# Patient Record
Sex: FEMALE | Race: WHITE | Employment: OTHER | RURAL
[De-identification: names, ages, dates, MRNs, and addresses within clinical notes are randomized per-mention and may not be internally consistent; named-entity substitution may affect disease eponyms.]

---

## 2017-01-19 DIAGNOSIS — K59.01 SLOW TRANSIT CONSTIPATION: ICD-10-CM

## 2017-01-20 RX ORDER — POLYETHYLENE GLYCOL 3350 17 G/17G
17 POWDER, FOR SOLUTION ORAL 2 TIMES DAILY
Qty: 510 G | Refills: 11 | Status: SHIPPED | OUTPATIENT
Start: 2017-01-20 | End: 2017-07-12

## 2017-02-21 ENCOUNTER — OFFICE VISIT (OUTPATIENT)
Dept: FAMILY MEDICINE CLINIC | Age: 82
End: 2017-02-21

## 2017-02-21 VITALS
SYSTOLIC BLOOD PRESSURE: 150 MMHG | HEART RATE: 80 BPM | OXYGEN SATURATION: 97 % | BODY MASS INDEX: 22.47 KG/M2 | DIASTOLIC BLOOD PRESSURE: 70 MMHG | RESPIRATION RATE: 16 BRPM | WEIGHT: 135 LBS

## 2017-02-21 DIAGNOSIS — R60.0 BILATERAL LEG EDEMA: Primary | ICD-10-CM

## 2017-02-21 DIAGNOSIS — I35.1 AORTIC VALVE INSUFFICIENCY, UNSPECIFIED ETIOLOGY: ICD-10-CM

## 2017-02-21 NOTE — PROGRESS NOTES
Chief Complaint   Patient presents with    Back Pain     burning sensation, small sore coccyx         HPI:       is a 80 y.o. female. She is a retired  who recently lost her best friend due to an MI. Yfn Ponce has a history of a pleural based B cell lymphoma (chemo), Pacemaker, renal insufficiency (Cr 1.7), CHF-compensated, HTN, and shingles. She lives alone. Leg edema management at home has been good and the edema tends to worsen as the day progresses. Elevates legs. Begrudgingly takes Bumetanide daily. Complains about the size of the potassium tablet. New Issues:  Sacral wound is improving. No Known Allergies    Current Outpatient Prescriptions   Medication Sig    polyethylene glycol (MIRALAX) 17 gram/dose powder Take 17 g by mouth two (2) times a day. Indications: Constipation    magnesium citrate 100 mg tab Take  by mouth.  lactulose (CHRONULAC) 10 gram/15 mL solution Take 20 g by mouth two (2) times a day.  naproxen sodium (ALEVE) 220 mg cap Take  by mouth.  bumetanide (BUMEX) 1 mg tablet Take 2 Tabs by mouth daily. Indications: EDEMA    potassium chloride (K-DUR, KLOR-CON) 10 mEq tablet Take 1 Tab by mouth daily.  prednisoLONE acetate (PRED FORTE) 1 % ophthalmic suspension Administer 1 Drop to left eye four (4) times daily.  triamcinolone acetonide (KENALOG) 0.1 % topical cream Apply  to affected area two (2) times daily as needed for Skin Irritation.  PHENYLEPH/MINERAL OIL/PETROLAT (PREPARATION H RE) Insert  into rectum.  magnesium hydroxide (MILK OF MAGNESIA) 400 mg/5 mL suspension Take 30 mL by mouth daily as needed for Constipation.  senna-docusate (PERICOLACE) 8.6-50 mg per tablet Take 1 Tab by mouth daily as needed for Constipation.  OTHER,NON-FORMULARY, Indications: Moisture cream with colloidal oatmeal    pilocarpine (PILOCAR) 4 % ophthalmic solution Administer 1 Drop to left eye four (4) times daily.     dorzolamide-timolol (COSOPT) 2-0.5 % ophthalmic solution Administer 1 Drop to left eye two (2) times a day.  bimatoprost (LUMIGAN) 0.03 % ophthalmic drops Administer 1 Drop to left eye nightly.  HYDROcodone-acetaminophen (NORCO) 5-325 mg per tablet Take 1 Tab by mouth every eight (8) hours as needed for Pain. Max Daily Amount: 3 Tabs.  hydrOXYzine (ATARAX) 25 mg tablet Take 25 mg by mouth nightly as needed. No current facility-administered medications for this visit. 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    [facility]     Bronchitis     Carotid disease, bilateral ([facility]     Cholelithiasis 2009    Clostridium difficile colitis July 17, 2016     [facility] admission    Constipation     CRI (chronic renal insufficiency)     Dyspepsia and other specified disorders of function of stomach     Frequent urination     Glaucoma     HCVD (hypertensive cardiovascular disease)     Hemorrhoids     History of seasonal allergies     Hypercholesterolemia          ROS:  Denies fever, chills, cough, chest pain, SOB,  nausea, vomiting, or diarrhea. Denies wt loss, wt gain, hemoptysis, hematochezia or melena. Physical Examination:    Visit Vitals    /70 (BP 1 Location: Left arm, BP Patient Position: Sitting)    Pulse 80    Resp 16    Wt 135 lb (61.2 kg)    SpO2 97%    BMI 22.47 kg/m2     General: Alert and Ox3, Fluent speech  HEENT:  NC/AT, EOMI, OP: clear  Neck:  Supple, no adenopathy, JVD, mass or bruit  Chest:  Clear to Ausculation, without wheezes, rales, rubs or ronchi  Cardiac: RRR with a 2/6 diastolic blow  Abdomen:  +BS, soft, nontender without palpable HSM  Extremities:  BLE edema about 5 cm above the malleoli   Neurologic:  Ambulatory without assist, CN 2-12 grossly intact. Moves all extremities.   Skin: no rash  Lymphadenopathy: no cervical or supraclavicular nodes      ASSESSMENT AND PLAN:     1. BLE edema:  Improved. Excellent care at home and compliance has resulted in resolution of her cellulitis and has kept her out of the hospital and nursing home. 2.  AI:  Stable at this time  3. Back pain:  Likely due to spinal stenosis. Needs to purchase a \"grabber\" and to avoid bending over. 4.  RTC in 2 months. 5. Tiny sacral decubitus is just about resolved. No orders of the defined types were placed in this encounter.       Je Palomino MD, [address]

## 2017-02-21 NOTE — MR AVS SNAPSHOT
Visit Information Date & Time Provider Department Dept. Phone Encounter #  
 2/21/2017  2:30 PM Hanna Montoya MD [address] [place] [address] [id number] Your Appointments 4/21/2017  1:00 PM  
ESTABLISHED PATIENT with Alcides Wang MD  
[id number] [facility] [place] - [facility]) Appt Note: Medtronic pacemaker check - poss f/u  
 [address] [phone]  
  
   
 [address] [address] Upcoming Health Maintenance Date Due DTaP/Tdap/Td series (1 - Tdap) 5/29/1941 ZOSTER VACCINE AGE 60> 5/29/1980 GLAUCOMA SCREENING Q2Y 5/29/1985 OSTEOPOROSIS SCREENING (DEXA) 5/29/1985 Pneumococcal 65+ High/Highest Risk (1 of 2 - PCV13) 5/29/1985 MEDICARE YEARLY EXAM 3/17/2017 Allergies as of 2/21/2017  Review Complete On: 2/21/2017 By: Valdemar Mina RN No Known Allergies Current Immunizations  Reviewed on 6/9/2016 Name Date Influenza High Dose Vaccine PF 11/22/2016  4:35 PM  
 Influenza Vaccine 10/7/2015 Not reviewed this visit Vitals BP Pulse Resp Weight(growth percentile) SpO2 BMI  
 150/70 (BP 1 Location: Left arm, BP Patient Position: Sitting) 80 16 135 lb (61.2 kg) 97% 22.47 kg/m2 Smoking Status Never Smoker BMI and BSA Data Body Mass Index Body Surface Area  
 22.47 kg/m 2 1.68 m 2 Preferred Pharmacy Pharmacy Name Phone [facility] [place] [address] [phone] Your Updated Medication List  
  
   
This list is accurate as of: 2/21/17  4:00 PM.  Always use your most recent med list.  
  
  
  
  
 ALEVE 220 mg Cap Generic drug:  naproxen sodium Take  by mouth. bumetanide 1 mg tablet Commonly known as:  Assunta Brink Take 2 Tabs by mouth daily. Indications: EDEMA COSOPT 22.3-6.8 mg/mL ophthalmic solution Generic drug:  dorzolamide-timolol Administer 1 Drop to left eye two (2) times a day. HYDROcodone-acetaminophen 5-325 mg per tablet Commonly known as:  Maribel Obregon Take 1 Tab by mouth every eight (8) hours as needed for Pain. Max Daily Amount: 3 Tabs. hydrOXYzine HCl 25 mg tablet Commonly known as:  ATARAX Take 25 mg by mouth nightly as needed. lactulose 10 gram/15 mL solution Commonly known as:  Tonna Jade Take 20 g by mouth two (2) times a day. LUMIGAN 0.03 % ophthalmic drops Generic drug:  bimatoprost  
Administer 1 Drop to left eye nightly.  
  
 magnesium citrate 100 mg Tab Take  by mouth.  
  
 magnesium hydroxide 400 mg/5 mL suspension Commonly known as:  MILK OF MAGNESIA Take 30 mL by mouth daily as needed for Constipation. OTHER(NON-FORMULARY) Indications: Moisture cream with colloidal oatmeal  
  
 pilocarpine 4 % ophthalmic solution Commonly known as:  PILOCAR Administer 1 Drop to left eye four (4) times daily. polyethylene glycol 17 gram/dose powder Commonly known as:  Ronaldo Chance Take 17 g by mouth two (2) times a day. Indications: Constipation  
  
 potassium chloride 10 mEq tablet Commonly known as:  K-DUR, KLOR-CON Take 1 Tab by mouth daily. prednisoLONE acetate 1 % ophthalmic suspension Commonly known as:  PRED FORTE Administer 1 Drop to left eye four (4) times daily. PREPARATION H RE Insert  into rectum. senna-docusate 8.6-50 mg per tablet Commonly known as:  Angelique Irwin Take 1 Tab by mouth daily as needed for Constipation. triamcinolone acetonide 0.1 % topical cream  
Commonly known as:  KENALOG Apply  to affected area two (2) times daily as needed for Skin Irritation. Introducing [facility]! [address] [facility] introduces C-sam patient portal. Now you can access parts of your medical record, email your doctor's office, and request medication refills online. 1. In your internet browser, go to [email or web address] 2. Click on the First Time User? Click Here link in the Sign In box. You will see the New Member Sign Up page. 3. Enter your Gamerius Access Code exactly as it appears below. You will not need to use this code after youve completed the sign-up process. If you do not sign up before the expiration date, you must request a new code. · Gamerius Access Code: [email or web address] Expires: 3/20/2017  4:08 PM 
 
4. Enter the last four digits of your Social Security Number (xxxx) and Date of Birth (mm/dd/yyyy) as indicated and click Submit. You will be taken to the next sign-up page. 5. Create a Zoned Nutritiont ID. This will be your Gamerius login ID and cannot be changed, so think of one that is secure and easy to remember. 6. Create a Gamerius password. You can change your password at any time. 7. Enter your Password Reset Question and Answer. This can be used at a later time if you forget your password. 8. Enter your e-mail address. You will receive e-mail notification when new information is available in [address] 9. Click Sign Up. You can now view and download portions of your medical record. 10. Click the Download Summary menu link to download a portable copy of your medical information. If you have questions, please visit the Frequently Asked Questions section of the Gamerius website. Remember, Gamerius is NOT to be used for urgent needs. For medical emergencies, dial 911. Now available from your iPhone and Android! Please provide this summary of care documentation to your next provider. Your primary care clinician is listed as Vaishali Shepherd. If you have any questions after today's visit, please call [phone].

## 2017-04-21 ENCOUNTER — CLINICAL SUPPORT (OUTPATIENT)
Dept: CARDIOLOGY CLINIC | Age: 82
End: 2017-04-21

## 2017-04-21 ENCOUNTER — OFFICE VISIT (OUTPATIENT)
Dept: CARDIOLOGY CLINIC | Age: 82
End: 2017-04-21

## 2017-04-21 VITALS
DIASTOLIC BLOOD PRESSURE: 70 MMHG | SYSTOLIC BLOOD PRESSURE: 130 MMHG | BODY MASS INDEX: 22.89 KG/M2 | HEART RATE: 84 BPM | OXYGEN SATURATION: 98 % | RESPIRATION RATE: 16 BRPM | WEIGHT: 137.4 LBS | HEIGHT: 65 IN

## 2017-04-21 DIAGNOSIS — Z95.0 PACEMAKER: Primary | ICD-10-CM

## 2017-04-21 DIAGNOSIS — Z95.0 CARDIAC PACEMAKER IN SITU: ICD-10-CM

## 2017-04-21 DIAGNOSIS — I11.9 HYPERTENSIVE HEART DISEASE WITHOUT HEART FAILURE: ICD-10-CM

## 2017-04-21 DIAGNOSIS — I50.32 DIASTOLIC CHF, CHRONIC (HCC): ICD-10-CM

## 2017-04-21 DIAGNOSIS — R60.0 LOCALIZED EDEMA: ICD-10-CM

## 2017-04-21 DIAGNOSIS — N18.9 CRI (CHRONIC RENAL INSUFFICIENCY), UNSPECIFIED STAGE: ICD-10-CM

## 2017-04-21 DIAGNOSIS — I44.2 ATRIOVENTRICULAR BLOCK, COMPLETE (HCC): ICD-10-CM

## 2017-04-21 DIAGNOSIS — I44.2 ATRIOVENTRICULAR BLOCK, COMPLETE (HCC): Primary | ICD-10-CM

## 2017-04-21 RX ORDER — FUROSEMIDE 20 MG/1
TABLET ORAL DAILY
COMMUNITY
End: 2017-04-21 | Stop reason: CLARIF

## 2017-04-21 NOTE — MR AVS SNAPSHOT
Visit Information Date & Time Provider Department Dept. Phone Encounter #  
 4/21/2017  1:00 PM Gutierrez Gillespie, [address] [facility] [phone] [id number] Your Appointments 4/25/2017  3:00 PM  
ESTABLISHED PATIENT with MD Carlos Clayton 38 ([facility]) Appt Note: 2 MO F/U  
 [address] [facility] [address] [facility] [address] [phone]  
  
   
 [address] [facility] [address] [place] [address],[address] [address]  
  
    
 11/17/2017 11:00 AM  
ESTABLISHED PATIENT with Gutierrez Gillespie MD  
[address] [place] [facility]) Appt Note: MEDTRONIC PACEMAKER CHECK AND 6 MO FU $0CP  
 [address] [facility] [address] [address] [phone]  
  
   
 [address] [facility] [address] [address] 7/31/2017  8:00 AM  
REMOTE OFFICE VISIT with [facility] Appt Note: NOT AN OFFICE VISIT - REMOTE MDT PM  
 [address] [facility]  
[phone] [address] [facility] Upcoming Health Maintenance Date Due DTaP/Tdap/Td series (1 - Tdap) 5/29/1941 ZOSTER VACCINE AGE 60> 5/29/1980 GLAUCOMA SCREENING Q2Y 5/29/1985 OSTEOPOROSIS SCREENING (DEXA) 5/29/1985 Pneumococcal 65+ High/Highest Risk (1 of 2 - PCV13) 5/29/1985 MEDICARE YEARLY EXAM 3/17/2017 Allergies as of 4/21/2017  Review Complete On: 4/21/2017 By: Gutierrez Gillespie MD  
 No Known Allergies Current Immunizations  Reviewed on 6/9/2016 Name Date Influenza High Dose Vaccine PF 11/22/2016  4:35 PM  
 Influenza Vaccine 10/7/2015 Not reviewed this visit You Were Diagnosed With   
  
 Codes Comments Atrioventricular block, complete (HCC)    -  Primary ICD-10-CM: X10.0 ICD-9-CM: 426.0 Cardiac pacemaker in situ     ICD-10-CM: Z95.0 ICD-9-CM: V45.01   
 Hypertensive heart disease without heart failure     ICD-10-CM: I11.9 ICD-9-CM: 402.90   
 CRI (chronic renal insufficiency), unspecified stage     ICD-10-CM: N18.9 ICD-9-CM: 585.9 Diastolic CHF, chronic (HCC)     ICD-10-CM: I50.32 
ICD-9-CM: 428.32, 428.0 Localized edema     ICD-10-CM: R60.0 ICD-9-CM: 527. 3 Vitals BP Pulse Resp Height(growth percentile) Weight(growth percentile) SpO2  
 130/70 (BP 1 Location: Left arm, BP Patient Position: Sitting) 84 16 5' 5\" (1.651 m) 137 lb 6.4 oz (62.3 kg) 98% BMI Smoking Status 22.86 kg/m2 Never Smoker Vitals History BMI and BSA Data Body Mass Index Body Surface Area  
 22.86 kg/m 2 1.69 m 2 Preferred Pharmacy Pharmacy Name Phone [place] [address] [phone] Your Updated Medication List  
  
   
This list is accurate as of: 4/21/17  1:48 PM.  Always use your most recent med list.  
  
  
  
  
 ALEVE 220 mg Cap Generic drug:  naproxen sodium Take  by mouth daily as needed. bumetanide 1 mg tablet Commonly known as:  Terressa Haymaker Take 2 Tabs by mouth daily. Indications: EDEMA COSOPT 22.3-6.8 mg/mL ophthalmic solution Generic drug:  dorzolamide-timolol Administer 1 Drop to left eye two (2) times a day. HYDROcodone-acetaminophen 5-325 mg per tablet Commonly known as:  Simmie Blonder Take 1 Tab by mouth every eight (8) hours as needed for Pain. Max Daily Amount: 3 Tabs. hydrOXYzine HCl 25 mg tablet Commonly known as:  ATARAX Take 25 mg by mouth nightly. lactulose 10 gram/15 mL solution Commonly known as:  Synchronicity.co Take 20 g by mouth two (2) times a day. LUMIGAN 0.03 % ophthalmic drops Generic drug:  bimatoprost  
Administer 1 Drop to left eye nightly.  
  
 magnesium hydroxide 400 mg/5 mL suspension Commonly known as:  MILK OF MAGNESIA Take 30 mL by mouth daily as needed for Constipation. OTHER(NON-FORMULARY) Indications: Moisture cream with colloidal oatmeal  
  
 pilocarpine 4 % ophthalmic solution Commonly known as:  PILOCAR Administer 1 Drop to left eye four (4) times daily. polyethylene glycol 17 gram/dose powder Commonly known as:  Ulysses Tang Take 17 g by mouth two (2) times a day. Indications: Constipation  
  
 potassium chloride 10 mEq tablet Commonly known as:  K-DUR, KLOR-CON Take 1 Tab by mouth daily. prednisoLONE acetate 1 % ophthalmic suspension Commonly known as:  PRED FORTE Administer 1 Drop to left eye four (4) times daily. PREPARATION H RE Insert  into rectum. senna-docusate 8.6-50 mg per tablet Commonly known as:  Eric Singer Take 1 Tab by mouth daily. triamcinolone acetonide 0.1 % topical cream  
Commonly known as:  KENALOG Apply  to affected area two (2) times daily as needed for Skin Irritation. Introducing [facility]! Miguel [facility] Mcintyre introduces Mobile Complete patient portal. Now you can access parts of your medical record, email your doctor's office, and request medication refills online. 1. In your internet browser, go to [email or web address] 2. Click on the First Time User? Click Here link in the Sign In box. You will see the New Member Sign Up page. 3. Enter your Mobile Complete Access Code exactly as it appears below. You will not need to use this code after youve completed the sign-up process. If you do not sign up before the expiration date, you must request a new code. · Mobile Complete Access Code: [email or web address] Expires: 7/20/2017  1:02 PM 
 
4. Enter the last four digits of your Social Security Number (xxxx) and Date of Birth (mm/dd/yyyy) as indicated and click Submit. You will be taken to the next sign-up page. 5. Create a The News Funnelt ID. This will be your Mobile Complete login ID and cannot be changed, so think of one that is secure and easy to remember. 6. Create a Odotech password. You can change your password at any time. 7. Enter your Password Reset Question and Answer. This can be used at a later time if you forget your password. 8. Enter your e-mail address. You will receive e-mail notification when new information is available in [address] 9. Click Sign Up. You can now view and download portions of your medical record. 10. Click the Download Summary menu link to download a portable copy of your medical information. If you have questions, please visit the Frequently Asked Questions section of the Odotech website. Remember, Odotech is NOT to be used for urgent needs. For medical emergencies, dial 911. Now available from your iPhone and Android! Please provide this summary of care documentation to your next provider. Your primary care clinician is listed as Monica Sandhu. If you have any questions after today's visit, please call [phone].

## 2017-04-21 NOTE — PROGRESS NOTES
Azalea Martinez is a 80 y.o. female is here for routine f/u and PPM check (see separate). No CV sx or complaints. Continues to see Dr. Yfn Dominguez. Some leg edema--diuretics, elevates, stockings, improved. The patient denies chest pain/ shortness of breath, orthopnea, PND, palpitations, syncope, presyncope or fatigue.        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    Atrioventricular block, complete (HCC)     HCVD (hypertensive cardiovascular disease)     CRI (chronic renal insufficiency)     Aortic valve insufficiency     Carotid disease, bilateral (HCC)       Palma Bond MD  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    B-cell lymphoma ([address]     Bronchitis     Carotid disease, bilateral ([address]     Cholelithiasis 2009    Clostridium difficile colitis July 17, 2016    [facility] admission    Constipation     CRI (chronic renal insufficiency)     Dyspepsia and other specified disorders of function of stomach     Frequent urination     Glaucoma     HCVD (hypertensive cardiovascular disease)     Hemorrhoids     History of seasonal allergies     Hypercholesterolemia       Past Surgical History:   Procedure Laterality Date    HX HIP FRACTURE TX Left     HX LOBECTOMY Right 11/1998    middle    HX PACEMAKER  2003    DDD    HX PACEMAKER  5/2012    Gen Change     No Known Allergies   Family History   Problem Relation Age of Onset    Heart Attack Sister     Coronary Artery Disease Mother     Heart Attack Mother     Cancer Mother      lung    Heart Disease Mother      Renal failure    Hypertension Mother     Heart Attack Father       Social History     Social History    Marital status: SINGLE     Spouse name: N/A   Danii Courtney Number of children: N/A    Years of education: N/A     Occupational History    office work Retired     Social History Main Topics    Smoking status: Never Smoker    Smokeless tobacco: Never Used    Alcohol use No    Drug use: Not on file    Sexual activity: Not on file     Other Topics Concern     Service No    Blood Transfusions No    Caffeine Concern No    Occupational Exposure No    Hobby Hazards No    Sleep Concern No    Stress Concern No    Weight Concern No    Special Diet No    Back Care No    Exercise No     still lives alone and cares for home    Bike Helmet No    Seat Belt Yes    Self-Exams No     Social History Narrative    ** Merged History Encounter **           Current Outpatient Prescriptions   Medication Sig    polyethylene glycol (MIRALAX) 17 gram/dose powder Take 17 g by mouth two (2) times a day. Indications: Constipation    lactulose (CHRONULAC) 10 gram/15 mL solution Take 20 g by mouth two (2) times a day.  naproxen sodium (ALEVE) 220 mg cap Take  by mouth daily as needed.  bumetanide (BUMEX) 1 mg tablet Take 2 Tabs by mouth daily. Indications: EDEMA    potassium chloride (K-DUR, KLOR-CON) 10 mEq tablet Take 1 Tab by mouth daily.  HYDROcodone-acetaminophen (NORCO) 5-325 mg per tablet Take 1 Tab by mouth every eight (8) hours as needed for Pain. Max Daily Amount: 3 Tabs. (Patient taking differently: Take 1 Tab by mouth every four (4) hours as needed for Pain.)    prednisoLONE acetate (PRED FORTE) 1 % ophthalmic suspension Administer 1 Drop to left eye four (4) times daily.  triamcinolone acetonide (KENALOG) 0.1 % topical cream Apply  to affected area two (2) times daily as needed for Skin Irritation.  hydrOXYzine (ATARAX) 25 mg tablet Take 25 mg by mouth nightly.  magnesium hydroxide (MILK OF MAGNESIA) 400 mg/5 mL suspension Take 30 mL by mouth daily as needed for Constipation.     senna-docusate (PERICOLACE) 8.6-50 mg per tablet Take 1 Tab by mouth daily.    pilocarpine (PILOCAR) 4 % ophthalmic solution Administer 1 Drop to left eye four (4) times daily.  dorzolamide-timolol (COSOPT) 2-0.5 % ophthalmic solution Administer 1 Drop to left eye two (2) times a day.  bimatoprost (LUMIGAN) 0.03 % ophthalmic drops Administer 1 Drop to left eye nightly.  PHENYLEPH/MINERAL OIL/PETROLAT (PREPARATION H RE) Insert  into rectum.  OTHER,NON-FORMULARY, Indications: Moisture cream with colloidal oatmeal     No current facility-administered medications for this visit. Review of Symptoms:    CONST  No weight change. No fever, chills, sweats    ENT No visual changes, URI sx, sore throat    CV  See HPI   RESP  No cough, or sputum, wheezing. Also see HPI   GI  No abdominal pain or change in bowel habits. No heartburn or dysphagia. No melena or rectal bleeding.   No dysuria, urgency, frequency, hematuria   MSKEL  No joint pain, swelling. No muscle pain. SKIN  No rash or lesions. NEURO  No headache, syncope, or seizure. No weakness, loss of sensation, or paresthesias. PSYCH  No low mood or depression  No anxiety. HE/LYMPH  No easy bruising, abnormal bleeding, or enlarged glands.         Physical ExamPhysical Exam:    Visit Vitals    /70 (BP 1 Location: Left arm, BP Patient Position: Sitting)    Pulse 84    Resp 16    Ht 5' 5\" (1.651 m)    Wt 137 lb 6.4 oz (62.3 kg)    SpO2 98%    BMI 22.86 kg/m2     Gen: NAD  HEENT:  PERRL, throat clear  Neck: no mass or thyromegaly, no JVD   Heart:  Regular,Nl S1S2,  III/VI murmur, no gallop or rub.   Lungs:  clear  Abdomen:   Soft, non-tender, bowel sounds are active.   Extremities:  Mild edema  Pulse: symmetric  Neuro: A&O times 3, WNL      Cardiographics    ECG: V paced      Labs:   Lab Results   Component Value Date/Time    Sodium 142 03/16/2016 03:57 PM    Sodium 141 01/04/2016 11:28 AM    Sodium 142 12/04/2015 09:15 AM    Potassium 5.0 03/16/2016 03:57 PM    Potassium 4.3 01/04/2016 11:28 AM    Potassium 4.5 12/04/2015 09:15 AM    Chloride 104 03/16/2016 03:57 PM    Chloride 102 01/04/2016 11:28 AM    Chloride 104 12/04/2015 09:15 AM    CO2 24 03/16/2016 03:57 PM    CO2 26 01/04/2016 11:28 AM    CO2 26 12/04/2015 09:15 AM    Glucose 95 03/16/2016 03:57 PM    Glucose 90 01/04/2016 11:28 AM    Glucose 90 12/04/2015 09:15 AM    BUN 38 03/16/2016 03:57 PM    BUN 33 01/04/2016 11:28 AM    BUN 44 12/04/2015 09:15 AM    Creatinine 1.63 03/16/2016 03:57 PM    Creatinine 1.46 01/04/2016 11:28 AM    Creatinine 1.45 12/04/2015 09:15 AM    BUN/Creatinine ratio 23 03/16/2016 03:57 PM    BUN/Creatinine ratio 23 01/04/2016 11:28 AM    BUN/Creatinine ratio 30 12/04/2015 09:15 AM    GFR est AA 31 03/16/2016 03:57 PM    GFR est AA 35 01/04/2016 11:28 AM    GFR est AA 35 12/04/2015 09:15 AM    GFR est non-AA 27 03/16/2016 03:57 PM    GFR est non-AA 30 01/04/2016 11:28 AM    GFR est non-AA 31 12/04/2015 09:15 AM    Calcium 9.5 03/16/2016 03:57 PM    Calcium 9.7 01/04/2016 11:28 AM    Calcium 9.1 12/04/2015 09:15 AM    Bilirubin, total 0.4 03/16/2016 03:57 PM    Bilirubin, total 0.4 12/04/2015 09:15 AM    AST (SGOT) 17 03/16/2016 03:57 PM    AST (SGOT) 22 12/04/2015 09:15 AM    Alk. phosphatase 87 03/16/2016 03:57 PM    Alk. phosphatase 72 12/04/2015 09:15 AM    Protein, total 7.1 03/16/2016 03:57 PM    Protein, total 6.4 12/04/2015 09:15 AM    Albumin 4.1 03/16/2016 03:57 PM    Albumin 4.0 12/04/2015 09:15 AM    A-G Ratio 1.4 03/16/2016 03:57 PM    A-G Ratio 1.7 12/04/2015 09:15 AM    ALT (SGPT) 7 03/16/2016 03:57 PM    ALT (SGPT) 10 12/04/2015 09:15 AM     No results found for: CPK, CPKX, CPX  Lab Results   Component Value Date/Time    Cholesterol, total 187 03/16/2016 03:57 PM    HDL Cholesterol 54 03/16/2016 03:57 PM    LDL, calculated 123 03/16/2016 03:57 PM    Triglyceride 51 03/16/2016 03:57 PM     No results found for this or any previous visit.     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 No CV sx or complaints. Continues to see Dr. Shereen Mcallister. Some leg edema--diuretics, elevates, stockings, improved. Plan:     Doing well with no adverse cardiac symptoms. PPM check today ok. Lipids and labs followed by PCP. Continue current care and f/u in 6 months.     Natali Penny MD

## 2017-04-21 NOTE — PROGRESS NOTES
Verified patient with two patient identifiers. Medications reviewed/approved by Dr. David Celestin. Verbal from Dr. David Celestin to remove the medications that were deleted during the visit.

## 2017-04-25 ENCOUNTER — OFFICE VISIT (OUTPATIENT)
Dept: FAMILY MEDICINE CLINIC | Age: 82
End: 2017-04-25

## 2017-04-25 VITALS
BODY MASS INDEX: 23.3 KG/M2 | DIASTOLIC BLOOD PRESSURE: 80 MMHG | RESPIRATION RATE: 16 BRPM | HEART RATE: 74 BPM | OXYGEN SATURATION: 98 % | SYSTOLIC BLOOD PRESSURE: 162 MMHG | WEIGHT: 140 LBS

## 2017-04-25 DIAGNOSIS — R60.0 BILATERAL LEG EDEMA: ICD-10-CM

## 2017-04-25 DIAGNOSIS — Z00.00 MEDICARE ANNUAL WELLNESS VISIT, SUBSEQUENT: Primary | ICD-10-CM

## 2017-04-25 DIAGNOSIS — Z23 ENCOUNTER FOR IMMUNIZATION: ICD-10-CM

## 2017-04-25 RX ORDER — HYDROCODONE BITARTRATE AND ACETAMINOPHEN 5; 325 MG/1; MG/1
1 TABLET ORAL
Qty: 20 TAB | Refills: 0 | Status: SHIPPED | OUTPATIENT
Start: 2017-04-25 | End: 2017-06-27 | Stop reason: SDUPTHER

## 2017-04-25 RX ORDER — FUROSEMIDE 20 MG/1
TABLET ORAL DAILY
COMMUNITY
End: 2017-05-22

## 2017-04-25 NOTE — MR AVS SNAPSHOT
Visit Information Date & Time Provider Department Dept. Phone Encounter #  
 4/25/2017  3:00 PM Astrid Grover MD [facility] [id number] Your Appointments 11/17/2017 11:00 AM  
ESTABLISHED PATIENT with Shannan Saini MD  
[address] [place] [facility]) Appt Note: [employer] PACEMAKER CHECK AND 6 MO FU $0CP  
 [address] [id number] [phone]  
  
   
 [address] [place] [address] [address] [address] 7/31/2017  8:00 AM  
REMOTE OFFICE VISIT with [facility] Jammie Pritchett) Appt Note: NOT AN OFFICE VISIT - REMOTE MDT PM  
 [address]  
[phone] [address] Upcoming Health Maintenance Date Due Pneumococcal 65+ High/Highest Risk (1 of 2 - PCV13) 5/29/1985 MEDICARE YEARLY EXAM 3/17/2017 GLAUCOMA SCREENING Q2Y 3/31/2019 DTaP/Tdap/Td series (2 - Td) 4/25/2027 Allergies as of 4/25/2017  Review Complete On: 4/25/2017 By: Myles Carter RN No Known Allergies Current Immunizations  Reviewed on 6/9/2016 Name Date Influenza High Dose Vaccine PF 11/22/2016  4:35 PM  
 Influenza Vaccine 10/7/2015 Pneumococcal Conjugate (PCV-13)  Incomplete Pneumococcal Polysaccharide (PPSV-23) 9/15/2009 Td 8/31/2009 Not reviewed this visit You Were Diagnosed With   
  
 Codes Comments Medicare annual wellness visit, subsequent    -  Primary ICD-10-CM: Z00.00 ICD-9-CM: V70.0 Encounter for immunization     ICD-10-CM: X27 ICD-9-CM: V03.89 Vitals BP Pulse Resp Weight(growth percentile) SpO2 BMI  
 162/80 (BP 1 Location: Left arm, BP Patient Position: Sitting) 74 16 140 lb (63.5 kg) 98% 23.3 kg/m2 Smoking Status Never Smoker BMI and BSA Data Body Mass Index Body Surface Area 23.3 kg/m 2 1.71 m 2 Preferred Pharmacy Pharmacy Name Phone [facility]  [phone] Your Updated Medication List  
  
   
This list is accurate as of: 4/25/17  3:53 PM.  Always use your most recent med list.  
  
  
  
  
 ALEVE 220 mg Cap Generic drug:  naproxen sodium Take  by mouth daily as needed. COSOPT 22.3-6.8 mg/mL ophthalmic solution Generic drug:  dorzolamide-timolol Administer 1 Drop to left eye two (2) times a day. HYDROcodone-acetaminophen 5-325 mg per tablet Commonly known as:  Joyice Breeze Take 1 Tab by mouth every eight (8) hours as needed for Pain. Max Daily Amount: 3 Tabs. hydrOXYzine HCl 25 mg tablet Commonly known as:  ATARAX Take 25 mg by mouth nightly. lactulose 10 gram/15 mL solution Commonly known as:  [place] Take 20 g by mouth two (2) times a day. LASIX 20 mg tablet Generic drug:  furosemide Take  by mouth daily. LUMIGAN 0.03 % ophthalmic drops Generic drug:  bimatoprost  
Administer 1 Drop to left eye nightly.  
  
 magnesium hydroxide 400 mg/5 mL suspension Commonly known as:  MILK OF MAGNESIA Take 30 mL by mouth daily as needed for Constipation. OTHER(NON-FORMULARY) Indications: Moisture cream with colloidal oatmeal  
  
 pilocarpine 4 % ophthalmic solution Commonly known as:  PILOCAR Administer 1 Drop to left eye four (4) times daily. polyethylene glycol 17 gram/dose powder Commonly known as:  Rosalia Michael Take 17 g by mouth two (2) times a day. Indications: Constipation  
  
 potassium chloride 10 mEq tablet Commonly known as:  K-DUR, KLOR-CON Take 1 Tab by mouth daily. prednisoLONE acetate 1 % ophthalmic suspension Commonly known as:  PRED FORTE Administer 1 Drop to left eye four (4) times daily. PREPARATION H RE Insert  into rectum. senna-docusate 8.6-50 mg per tablet Commonly known as:  Vilma Darling  
 Take 1 Tab by mouth daily. triamcinolone acetonide 0.1 % topical cream  
Commonly known as:  KENALOG Apply  to affected area two (2) times daily as needed for Skin Irritation. Prescriptions Printed Refills HYDROcodone-acetaminophen (NORCO) 5-325 mg per tablet 0 Sig: Take 1 Tab by mouth every eight (8) hours as needed for Pain. Max Daily Amount: 3 Tabs. Class: Print Route: Oral  
  
We Performed the Following ADMIN PNEUMOCOCCAL VACCINE [ HCPCS] PNEUMOCOCCAL CONJ VACCINE 13 VALENT IM [id number] CPT(R)] Introducing [facility]! [facility] introduces Digital Karma patient portal. Now you can access parts of your medical record, email your doctor's office, and request medication refills online. 1. In your internet browser, go to [email or web address] 2. Click on the First Time User? Click Here link in the Sign In box. You will see the New Member Sign Up page. 3. Enter your Digital Karma Access Code exactly as it appears below. You will not need to use this code after youve completed the sign-up process. If you do not sign up before the expiration date, you must request a new code. · Digital Karma Access Code: [email or web address] Expires: 7/20/2017  1:02 PM 
 
4. Enter the last four digits of your Social Security Number (xxxx) and Date of Birth (mm/dd/yyyy) as indicated and click Submit. You will be taken to the next sign-up page. 5. Create a [employer] ID. This will be your Digital Karma login ID and cannot be changed, so think of one that is secure and easy to remember. 6. Create a Digital Karma password. You can change your password at any time. 7. Enter your Password Reset Question and Answer. This can be used at a later time if you forget your password. 8. Enter your e-mail address. You will receive e-mail notification when new information is available in [address]. 9. Click Sign Up. You can now view and download portions of your medical record. 10. Click the Download Summary menu link to download a portable copy of your medical information. If you have questions, please visit the Frequently Asked Questions section of the TeamSnap website. Remember, TeamSnap is NOT to be used for urgent needs. For medical emergencies, dial 911. Now available from your iPhone and Android! Please provide this summary of care documentation to your next provider. Your primary care clinician is listed as Kesha Garcia. If you have any questions after today's visit, please call [phone].

## 2017-04-25 NOTE — ACP (ADVANCE CARE PLANNING)
Patient does not have an Advance directive, given [address] directive form. Discussed difference from Living will that may not include the Advance Directive clause.

## 2017-04-25 NOTE — ACP (ADVANCE CARE PLANNING)
In the event that she is unable to speak for herself, please contact [facility] or [employer]. Rozina Hickey can be reached at [phone].     [place] [address]

## 2017-04-25 NOTE — PROGRESS NOTES
Chief Complaint   Patient presents with    Annual Wellness Visit         HPI:      Fabian Diego is a 80 y.o. female. She is a retired  who recently lost her best friend due to an MI. Paras Chirinos has a history of a pleural based B cell lymphoma (chemo), Pacemaker, renal insufficiency (Cr 1.7), CHF-compensated, HTN, and shingles. She lives alone. For the past 2 years she has struggled with BLE edema. For the past 2 months, she has propped up her legs in the bed at night and has noticed improvement; no longer has open oozing skin. And, in fact, her skin has completely healed. She is due for a SAWV today. No Known Allergies    Current Outpatient Prescriptions   Medication Sig    furosemide (LASIX) 20 mg tablet Take  by mouth daily.  HYDROcodone-acetaminophen (NORCO) 5-325 mg per tablet Take 1 Tab by mouth every eight (8) hours as needed for Pain. Max Daily Amount: 3 Tabs.  polyethylene glycol (MIRALAX) 17 gram/dose powder Take 17 g by mouth two (2) times a day. Indications: Constipation    lactulose (CHRONULAC) 10 gram/15 mL solution Take 20 g by mouth two (2) times a day.  naproxen sodium (ALEVE) 220 mg cap Take  by mouth daily as needed.  prednisoLONE acetate (PRED FORTE) 1 % ophthalmic suspension Administer 1 Drop to left eye four (4) times daily.  triamcinolone acetonide (KENALOG) 0.1 % topical cream Apply  to affected area two (2) times daily as needed for Skin Irritation.  PHENYLEPH/MINERAL OIL/PETROLAT (PREPARATION H RE) Insert  into rectum.  hydrOXYzine (ATARAX) 25 mg tablet Take 25 mg by mouth nightly.  magnesium hydroxide (MILK OF MAGNESIA) 400 mg/5 mL suspension Take 30 mL by mouth daily as needed for Constipation.  senna-docusate (PERICOLACE) 8.6-50 mg per tablet Take 1 Tab by mouth daily.     OTHER,NON-FORMULARY, Indications: Moisture cream with colloidal oatmeal    pilocarpine (PILOCAR) 4 % ophthalmic solution Administer 1 Drop to left eye four (4) times daily.    dorzolamide-timolol (COSOPT) 2-0.5 % ophthalmic solution Administer 1 Drop to left eye two (2) times a day.  bimatoprost (LUMIGAN) 0.03 % ophthalmic drops Administer 1 Drop to left eye nightly.  potassium chloride (K-DUR, KLOR-CON) 10 mEq tablet Take 1 Tab by mouth daily. No current facility-administered medications for this visit. 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    [facility]     Bronchitis     Carotid disease, bilateral ([facility]     Cholelithiasis 2009    Clostridium difficile colitis July 17, 2016    [address]    Constipation     CRI (chronic renal insufficiency)     Dyspepsia and other specified disorders of function of stomach     Frequent urination     Glaucoma     HCVD (hypertensive cardiovascular disease)     Hemorrhoids     History of seasonal allergies     Hypercholesterolemia          ROS:  Denies fever, chills, cough, chest pain, SOB,  nausea, vomiting, or diarrhea. Denies wt loss, wt gain, hemoptysis, hematochezia or melena. Physical Examination:    /80 (BP 1 Location: Left arm, BP Patient Position: Sitting)  Pulse 74  Resp 16  Wt 140 lb (63.5 kg)  SpO2 98%  BMI 23.3 kg/m2    General: Alert and Ox3, Fluent speech  HEENT:  NC/AT, EOMI, OP: clear  Neck:  Supple, no adenopathy, JVD, mass or bruit  Chest:  Clear to Ausculation, without wheezes, rales, rubs or ronchi  Cardiac: RRR  Abdomen:  +BS, soft, nontender without palpable HSM  Extremities:  No cyanosis, clubbing or edema  Neurologic:  Ambulatory without assist, CN 2-12 grossly intact. Moves all extremities. Skin: no rash  Lymphadenopathy: no cervical or supraclavicular nodes      ASSESSMENT AND PLAN:     1. BLE edema:  Amazing improvement. 2. Knee pain:  I have given her 20 hydrocodone tablets for occasional night time use.   She says this should last her several months. 3.  P 13 today  4. SAWV today. 5.  ACP update    Orders Placed This Encounter    PNEUMOCOCCAL CONJ VACCINE 13 VALENT IM    ADMIN PNEUMOCOCCAL VACCINE    furosemide (LASIX) 20 mg tablet     Sig: Take  by mouth daily.  HYDROcodone-acetaminophen (NORCO) 5-325 mg per tablet     Sig: Take 1 Tab by mouth every eight (8) hours as needed for Pain. Max Daily Amount: 3 Tabs. Dispense:  20 Tab     Refill:  0       Cy Choudhary MD, FACP      ______________________________________________________________________    Tyshawn Sanchez is a 80 y.o. female and presents for annual Medicare Wellness Visit. Problem List: Reviewed with patient and discussed risk factors. Patient Active Problem List   Diagnosis Code    Atrioventricular block, complete (HCC) I44.2    HCVD (hypertensive cardiovascular disease) I11.9    CRI (chronic renal insufficiency) N18.9    Aortic valve insufficiency I35.1    Carotid disease, bilateral (HCC) I77.9    Squamous cell carcinoma of left wrist C44.629    Clostridium difficile colitis A04.7    Bilateral leg edema R60.0       Current medical providers:  Patient Care Team:  Leonela Barrera MD as PCP - General (Internal Medicine)  Nicol Ramsay MD (Cardiology)  Dk Rojo MD (Surgery)  Dwayne Valdez MD (General Surgery)    [facility], Medications/Allergies: reviewed, on chart. Female Alcohol Screening: On any occasion during the past 3 months, have you had more than 3 drinks containing alcohol? No    Do you average more than 7 drinks per week? No    ROS:  Constitutional: No fever, chills or weight loss  Respiratory: No cough, SOB   CV: No chest pain or Palpitations    Objective:  Visit Vitals    /80 (BP 1 Location: Left arm, BP Patient Position: Sitting)    Pulse 74    Resp 16    Wt 140 lb (63.5 kg)    SpO2 98%    BMI 23.3 kg/m2    Body mass index is 23.3 kg/(m^2).     Assessment of cognitive impairment: Alert and oriented x 3    Depression Screen:   PHQ 2 / 9, over the last two weeks 4/25/2017   Little interest or pleasure in doing things Not at all   Feeling down, depressed or hopeless -   Total Score PHQ 2 -       Fall Risk Assessment:    Fall Risk Assessment, last 12 mths 4/25/2017   Able to walk? Yes   Fall in past 12 months? No   Fall with injury? -   Number of falls in past 12 months -   Fall Risk Score -       Functional Ability:   Does the patient exhibit a steady gait? yes   How long did it take the patient to get up and walk from a sitting position? 12 sec   Is the patient self reliant?  (ie can do own laundry, meals, household chores)  yes     Does the patient handle his/her own medications? yes     Does the patient handle his/her own money? yes     Is the patients home safe (ie good lighting, handrails on stairs and bath, etc.)? yes     Did you notice or did patient express any hearing difficulties? yes     Did you notice or did patient express any vision difficulties? no       Advance Care Planning:   Patient was offered the opportunity to discuss advance care planning:  yes     Does patient have an Advance Directive:  yes   If no, did you provide information on Caring Connections?  no       Plan:      Orders Placed This Encounter    PNEUMOCOCCAL CONJ VACCINE 13 VALENT IM    ADMIN PNEUMOCOCCAL VACCINE    furosemide (LASIX) 20 mg tablet    HYDROcodone-acetaminophen (NORCO) 5-325 mg per tablet       Health Maintenance   Topic Date Due    Pneumococcal 65+ High/Highest Risk (1 of 2 - PCV13) 05/29/1985    MEDICARE YEARLY EXAM  03/17/2017    GLAUCOMA SCREENING Q2Y  03/31/2019    DTaP/Tdap/Td series (2 - Td) 04/25/2027    OSTEOPOROSIS SCREENING (DEXA)  Addressed    ZOSTER VACCINE AGE 60>  Addressed    INFLUENZA AGE 9 TO ADULT  Completed       *Patient verbalized understanding and agreement with the plan. A copy of the After Visit Summary with personalized health plan was given to the patient today.

## 2017-05-05 ENCOUNTER — TELEPHONE (OUTPATIENT)
Dept: FAMILY MEDICINE CLINIC | Age: 82
End: 2017-05-05

## 2017-05-05 NOTE — TELEPHONE ENCOUNTER
Call from caregiver, patient had a reaction to Pneumonia vaccine, still has a red blotch on her arm but no other symptoms.

## 2017-05-11 ENCOUNTER — LAB ONLY (OUTPATIENT)
Dept: FAMILY MEDICINE CLINIC | Age: 82
End: 2017-05-11

## 2017-05-11 ENCOUNTER — TELEPHONE (OUTPATIENT)
Dept: FAMILY MEDICINE CLINIC | Age: 82
End: 2017-05-11

## 2017-05-11 DIAGNOSIS — N30.01 ACUTE CYSTITIS WITH HEMATURIA: Primary | ICD-10-CM

## 2017-05-11 DIAGNOSIS — N18.9 CRI (CHRONIC RENAL INSUFFICIENCY), UNSPECIFIED STAGE: Primary | ICD-10-CM

## 2017-05-11 DIAGNOSIS — R31.9 HEMATURIA, UNSPECIFIED: ICD-10-CM

## 2017-05-11 LAB
BILIRUB UR QL STRIP: NEGATIVE
GLUCOSE UR-MCNC: NEGATIVE MG/DL
KETONES P FAST UR STRIP-MCNC: NEGATIVE MG/DL
PH UR STRIP: 7 [PH] (ref 4.6–8)
PROT UR QL STRIP: POSITIVE MG/DL
SP GR UR STRIP: 1.02 (ref 1–1.03)
UA UROBILINOGEN AMB POC: NORMAL (ref 0.2–1)
URINALYSIS CLARITY POC: CLEAR
URINALYSIS COLOR POC: YELLOW
URINE BLOOD POC: NORMAL
URINE LEUKOCYTES POC: NEGATIVE
URINE NITRITES POC: NEGATIVE

## 2017-05-11 RX ORDER — NITROFURANTOIN 25; 75 MG/1; MG/1
100 CAPSULE ORAL 2 TIMES DAILY
Qty: 10 CAP | Refills: 0 | Status: SHIPPED | OUTPATIENT
Start: 2017-05-11 | End: 2017-05-31 | Stop reason: ALTCHOICE

## 2017-05-11 NOTE — TELEPHONE ENCOUNTER
[phone] Jennifer (nery) please call back as Tiffany Pack isn't feeling well and she's 80 yrs old. She has been feeling bad for a couple of days now, maybe she needs to be seen or talk to her about conditions and symptoms?   Thanks,

## 2017-05-11 NOTE — MR AVS SNAPSHOT
Visit Information Date & Time Provider Department Dept. Phone Encounter #  
 5/11/2017  3:50 PM Purnima 32 [id number] Your Appointments 6/27/2017  3:30 PM  
ESTABLISHED PATIENT with FRANCIE Vasques 38 ([address]) Appt Note: 2 month f/u  
 1000 [facility] [address] [facility] [address] [phone]  
  
   
 [facility] [address] [facility] [address]  
  
    
 11/17/2017 11:00 AM  
ESTABLISHED PATIENT with Fiordaliza Green MD  
[address] [facility] [address]) Appt Note: [employer] PACEMAKER CHECK AND 6 MO FU $0CP  
 [address] [address] [phone]  
  
   
 [address] [address] 7/31/2017  8:00 AM  
REMOTE OFFICE VISIT with [facility] [address]) Appt Note: NOT AN OFFICE VISIT - REMOTE MDT PM  
 [id number] [facility]  
[phone] [address] [facility] Upcoming Health Maintenance Date Due INFLUENZA AGE 9 TO ADULT 8/1/2017 MEDICARE YEARLY EXAM 4/26/2018 GLAUCOMA SCREENING Q2Y 3/31/2019 DTaP/Tdap/Td series (2 - Td) 4/25/2027 Allergies as of 5/11/2017  Review Complete On: 5/11/2017 By: Bree Piedra MD  
 No Known Allergies Current Immunizations  Reviewed on 6/9/2016 Name Date Influenza High Dose Vaccine PF 11/22/2016  4:35 PM  
 Influenza Vaccine 10/7/2015 Pneumococcal Conjugate (PCV-13) 4/25/2017  4:01 PM  
 Pneumococcal Polysaccharide (PPSV-23) 9/15/2009 Td 8/31/2009 Not reviewed this visit You Were Diagnosed With   
  
 Codes Comments CRI (chronic renal insufficiency), unspecified stage    -  Primary ICD-10-CM: N18.9 ICD-9-CM: 585.9 Hematuria, unspecified     ICD-10-CM: R31.9 ICD-9-CM: 599.70 Vitals Smoking Status Never Smoker Preferred Pharmacy Pharmacy Name Phone [facility]  [facility] [phone] Your Updated Medication List  
  
   
This list is accurate as of: 5/11/17  4:18 PM.  Always use your most recent med list.  
  
  
  
  
 ALEVE 220 mg Cap Generic drug:  naproxen sodium Take  by mouth daily as needed. COSOPT 22.3-6.8 mg/mL ophthalmic solution Generic drug:  dorzolamide-timolol Administer 1 Drop to left eye two (2) times a day. HYDROcodone-acetaminophen 5-325 mg per tablet Commonly known as:  Lynnda Levo Take 1 Tab by mouth every eight (8) hours as needed for Pain. Max Daily Amount: 3 Tabs. hydrOXYzine HCl 25 mg tablet Commonly known as:  ATARAX Take 25 mg by mouth nightly. lactulose 10 gram/15 mL solution Commonly known as:  Ebbie Pander Take 20 g by mouth two (2) times a day. LASIX 20 mg tablet Generic drug:  furosemide Take  by mouth daily. LUMIGAN 0.03 % ophthalmic drops Generic drug:  bimatoprost  
Administer 1 Drop to left eye nightly.  
  
 magnesium hydroxide 400 mg/5 mL suspension Commonly known as:  MILK OF MAGNESIA Take 30 mL by mouth daily as needed for Constipation. nitrofurantoin (macrocrystal-monohydrate) 100 mg capsule Commonly known as:  MACROBID Take 1 Cap by mouth two (2) times a day. OTHER(NON-FORMULARY) Indications: Moisture cream with colloidal oatmeal  
  
 pilocarpine 4 % ophthalmic solution Commonly known as:  PILOCAR Administer 1 Drop to left eye four (4) times daily. polyethylene glycol 17 gram/dose powder Commonly known as:  Madelin Domonique Take 17 g by mouth two (2) times a day. Indications: Constipation  
  
 potassium chloride 10 mEq tablet Commonly known as:  KLOR-CON Take 1 Tab by mouth daily. prednisoLONE acetate 1 % ophthalmic suspension Commonly known as:  PRED FORTE Administer 1 Drop to left eye four (4) times daily. PREPARATION H RE Insert  into rectum. senna-docusate 8.6-50 mg per tablet Commonly known as:  Sean Bumps Take 1 Tab by mouth daily. triamcinolone acetonide 0.1 % topical cream  
Commonly known as:  KENALOG Apply  to affected area two (2) times daily as needed for Skin Irritation. We Performed the Following AMB POC URINALYSIS DIP STICK MANUAL W/O MICRO [12624 CPT(R)] CULTURE, URINE [id number] CPT(R)] Patient Instructions If you have any questions regarding [employer], you may call [employer] support at [phone]. Introducing [facility] & HEALTH SERVICES! Grace Chan introduces [employer] patient portal. Now you can access parts of your medical record, email your doctor's office, and request medication refills online. 1. In your internet browser, go to [email or web address] 2. Click on the First Time User? Click Here link in the Sign In box. You will see the New Member Sign Up page. 3. Enter your [employer] Access Code exactly as it appears below. You will not need to use this code after youve completed the sign-up process. If you do not sign up before the expiration date, you must request a new code. · [employer] Access Code: [email or web address] Expires: 7/20/2017  1:02 PM 
 
4. Enter the last four digits of your Social Security Number (xxxx) and Date of Birth (mm/dd/yyyy) as indicated and click Submit. You will be taken to the next sign-up page. 5. Create a S[employer] ID. This will be your [employer] login ID and cannot be changed, so think of one that is secure and easy to remember. 6. Create a [employer] password. You can change your password at any time. 7. Enter your Password Reset Question and Answer. This can be used at a later time if you forget your password. 8. Enter your e-mail address. You will receive e-mail notification when new information is available in [address] 9. Click Sign Up. You can now view and download portions of your medical record. 10. Click the Download Summary menu link to download a portable copy of your medical information. If you have questions, please visit the Frequently Asked Questions section of the [employer] website. Remember, [employer] is NOT to be used for urgent needs. For medical emergencies, dial 911. Now available from your iPhone and Android! Please provide this summary of care documentation to your next provider. Your primary care clinician is listed as Sidra Medina. If you have any questions after today's visit, please call [phone].

## 2017-05-11 NOTE — TELEPHONE ENCOUNTER
Not felt good for several days, nausea, poor appetite. Feel awful, no bad pain  But back hurts some. Spoke with [employer] and [employer] both. Discussed obtaining a urine specimen to rule out UTI, family to  a specimen cup, they don't want to get her out in this weather if they don't have to.

## 2017-05-13 LAB — BACTERIA UR CULT: NORMAL

## 2017-05-23 ENCOUNTER — TELEPHONE (OUTPATIENT)
Dept: FAMILY MEDICINE CLINIC | Age: 82
End: 2017-05-23

## 2017-05-30 ENCOUNTER — TELEPHONE (OUTPATIENT)
Dept: FAMILY MEDICINE CLINIC | Age: 82
End: 2017-05-30

## 2017-05-30 NOTE — TELEPHONE ENCOUNTER
Yuriy Castañeda called and wanted to see if Mark Crane could come in to see Dr Johanna Hinojosa sometime this week. Could you please give her a call back at [phone]. Thank you.

## 2017-05-31 ENCOUNTER — OFFICE VISIT (OUTPATIENT)
Dept: FAMILY MEDICINE CLINIC | Age: 82
End: 2017-05-31

## 2017-05-31 DIAGNOSIS — B02.9 HERPES ZOSTER WITHOUT COMPLICATION: Primary | ICD-10-CM

## 2017-05-31 RX ORDER — FAMCICLOVIR 500 MG/1
500 TABLET ORAL 3 TIMES DAILY
Qty: 21 TAB | Refills: 0 | Status: SHIPPED | OUTPATIENT
Start: 2017-05-31 | End: 2017-06-07

## 2017-05-31 RX ORDER — BUMETANIDE 0.5 MG/1
1 TABLET ORAL DAILY
COMMUNITY
End: 2017-07-12

## 2017-05-31 NOTE — MR AVS SNAPSHOT
Visit Information Date & Time Provider Department Dept. Phone Encounter #  
 5/31/2017  1:00 PM Darya Mann, [address] [place] [address] [id number] Follow-up Instructions Return in about 2 weeks (around 6/14/2017). Follow-up and Disposition History Your Appointments 6/27/2017  3:30 PM  
ESTABLISHED PATIENT with FRANCIE Mitchell 38 ([facility] Appt Note: 2 month f/u  
 1000 [facility] [address] [address] [phone]  
  
   
 1000 [facility] [address] [address] [address]  
  
    
 11/17/2017 11:00 AM  
ESTABLISHED PATIENT with Raymundo Maciel MD  
[address] [facility] [address] Appt Note: [employer] PACEMAKER CHECK AND 6 MO FU $0CP  
 [address] [address] [phone]  
  
   
 [address] [facility] [address] [address] 7/31/2017  8:00 AM  
REMOTE OFFICE VISIT with [facility]) Appt Note: NOT AN OFFICE VISIT - REMOTE MDT PM  
 [address] [facility]  
[phone] [address] [facility] Upcoming Health Maintenance Date Due INFLUENZA AGE 9 TO ADULT 8/1/2017 MEDICARE YEARLY EXAM 4/26/2018 GLAUCOMA SCREENING Q2Y 3/31/2019 DTaP/Tdap/Td series (2 - Td) 4/25/2027 Allergies as of 5/31/2017  Review Complete On: 5/31/2017 By: Darya Mann MD  
 No Known Allergies Current Immunizations  Reviewed on 6/9/2016 Name Date Influenza High Dose Vaccine PF 11/22/2016  4:35 PM  
 Influenza Vaccine 10/7/2015 Pneumococcal Conjugate (PCV-13) 4/25/2017  4:01 PM  
 Pneumococcal Polysaccharide (PPSV-23) 9/15/2009 Td 8/31/2009 Not reviewed this visit You Were Diagnosed With   
  
 Codes Comments Herpes zoster without complication    -  Primary ICD-10-CM: B02.9 ICD-9-CM: 053.9 Vitals Smoking Status Never Smoker Preferred Pharmacy Pharmacy Name Phone [facility] - [facility] [phone] Your Updated Medication List  
  
   
This list is accurate as of: 5/31/17  1:58 PM.  Always use your most recent med list.  
  
  
  
  
 ALEVE 220 mg Cap Generic drug:  naproxen sodium Take  by mouth daily as needed. bumetanide 0.5 mg tablet Commonly known as:  Curry Melgar Take 1 mg by mouth daily. COSOPT 22.3-6.8 mg/mL ophthalmic solution Generic drug:  dorzolamide-timolol Administer 1 Drop to left eye two (2) times a day. famciclovir 500 mg tablet Commonly known as:  [facility] Take 1 Tab by mouth three (3) times daily for 7 days. HYDROcodone-acetaminophen 5-325 mg per tablet Commonly known as:  [facility] Take 1 Tab by mouth every eight (8) hours as needed for Pain. Max Daily Amount: 3 Tabs. hydrOXYzine HCl 25 mg tablet Commonly known as:  ATARAX Take 1 Tab by mouth nightly as needed for Itching. Indications: PRURITUS OF SKIN  
  
 lactulose 10 gram/15 mL solution Commonly known as:  Dwana Sheffield Take 20 g by mouth two (2) times a day. LUMIGAN 0.03 % ophthalmic drops Generic drug:  bimatoprost  
Administer 1 Drop to left eye nightly.  
  
 magnesium hydroxide 400 mg/5 mL suspension Commonly known as:  MILK OF MAGNESIA Take 30 mL by mouth daily as needed for Constipation. OTHER(NON-FORMULARY) Indications: Moisture cream with colloidal oatmeal  
  
 pilocarpine 4 % ophthalmic solution Commonly known as:  PILOCAR Administer 1 Drop to left eye four (4) times daily. polyethylene glycol 17 gram/dose powder Commonly known as:  Sharrell Pomona Take 17 g by mouth two (2) times a day. Indications: Constipation  
  
 potassium chloride 10 mEq tablet Commonly known as:  KLOR-CON Take 1 Tab by mouth daily. prednisoLONE acetate 1 % ophthalmic suspension Commonly known as:  PRED FORTE Administer 1 Drop to left eye four (4) times daily. PREPARATION H RE Insert  into rectum. senna-docusate 8.6-50 mg per tablet Commonly known as:  Noel Sicks Take 1 Tab by mouth daily. triamcinolone acetonide 0.1 % topical cream  
Commonly known as:  KENALOG Apply  to affected area two (2) times daily as needed for Skin Irritation. Prescriptions Sent to Pharmacy Refills  
 famciclovir (FAMVIR) 500 mg tablet 0 Sig: Take 1 Tab by mouth three (3) times daily for 7 days. Class: Normal  
 Pharmacy: [address] #: [phone] Route: Oral  
  
Follow-up Instructions Return in about 2 weeks (around 6/14/2017). Introducing [facility] & HEALTH SERVICES! Jennifer Villalba introduces [employer] patient portal. Now you can access parts of your medical record, email your doctor's office, and request medication refills online. 1. In your internet browser, go to [email or web address] 2. Click on the First Time User? Click Here link in the Sign In box. You will see the New Member Sign Up page. 3. Enter your [employer] Access Code exactly as it appears below. You will not need to use this code after youve completed the sign-up process. If you do not sign up before the expiration date, you must request a new code. · [employer] Access Code: [email or web address] Expires: 7/20/2017  1:02 PM 
 
4. Enter the last four digits of your Social Security Number (xxxx) and Date of Birth (mm/dd/yyyy) as indicated and click Submit. You will be taken to the next sign-up page. 5. Create a [employer] ID. This will be your [employer] login ID and cannot be changed, so think of one that is secure and easy to remember. 6. Create a [employer] password. You can change your password at any time. 7. Enter your Password Reset Question and Answer. This can be used at a later time if you forget your password. 8. Enter your e-mail address. You will receive e-mail notification when new information is available in [address] 9. Click Sign Up. You can now view and download portions of your medical record. 10. Click the Download Summary menu link to download a portable copy of your medical information. If you have questions, please visit the Frequently Asked Questions section of the [employer] website. Remember, [employer] is NOT to be used for urgent needs. For medical emergencies, dial 911. Now available from your iPhone and Android! Please provide this summary of care documentation to your next provider. Your primary care clinician is listed as Kesha Garcia. If you have any questions after today's visit, please call [phone].

## 2017-05-31 NOTE — PROGRESS NOTES
Chief Complaint   Patient presents with    Lethargy         HPI:         is a 80 y.o. female who notes the onset of a skin problem. The details are as follows:  Felt ill for 7 days. Home visit and CXR not revealing. Rash noted on the left flank and she was brought to the office today for evaluation. No Known Allergies    Current Outpatient Prescriptions   Medication Sig    bumetanide (BUMEX) 0.5 mg tablet Take 1 mg by mouth daily.  famciclovir (FAMVIR) 500 mg tablet Take 1 Tab by mouth three (3) times daily for 7 days.  polyethylene glycol (MIRALAX) 17 gram/dose powder Take 17 g by mouth two (2) times a day. Indications: Constipation    lactulose (CHRONULAC) 10 gram/15 mL solution Take 20 g by mouth two (2) times a day.  naproxen sodium (ALEVE) 220 mg cap Take  by mouth daily as needed.  prednisoLONE acetate (PRED FORTE) 1 % ophthalmic suspension Administer 1 Drop to left eye four (4) times daily.  triamcinolone acetonide (KENALOG) 0.1 % topical cream Apply  to affected area two (2) times daily as needed for Skin Irritation.  PHENYLEPH/MINERAL OIL/PETROLAT (PREPARATION H RE) Insert  into rectum.  magnesium hydroxide (MILK OF MAGNESIA) 400 mg/5 mL suspension Take 30 mL by mouth daily as needed for Constipation.  senna-docusate (PERICOLACE) 8.6-50 mg per tablet Take 1 Tab by mouth daily.  OTHER,NON-FORMULARY, Indications: Moisture cream with colloidal oatmeal    pilocarpine (PILOCAR) 4 % ophthalmic solution Administer 1 Drop to left eye four (4) times daily.  dorzolamide-timolol (COSOPT) 2-0.5 % ophthalmic solution Administer 1 Drop to left eye two (2) times a day.  bimatoprost (LUMIGAN) 0.03 % ophthalmic drops Administer 1 Drop to left eye nightly.  hydrOXYzine HCl (ATARAX) 25 mg tablet Take 1 Tab by mouth nightly as needed for Itching.  Indications: PRURITUS OF SKIN    HYDROcodone-acetaminophen (NORCO) 5-325 mg per tablet Take 1 Tab by mouth every eight (8) hours as needed for Pain. Max Daily Amount: 3 Tabs.  potassium chloride (K-DUR, KLOR-CON) 10 mEq tablet Take 1 Tab by mouth daily. No current facility-administered medications for this visit. 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    [facility]cell [facility]     Bronchitis     Carotid disease, bilateral ([facility]     Cholelithiasis 2009    Clostridium difficile colitis July 17, 2016    [facility] admission    Constipation     CRI (chronic renal insufficiency)     Dyspepsia and other specified disorders of function of stomach     Frequent urination     Glaucoma     HCVD (hypertensive cardiovascular disease)     Hemorrhoids     History of seasonal allergies     Hypercholesterolemia          ROS:  Denies fever, chills, cough, chest pain, SOB,  nausea, vomiting, or diarrhea. Denies wt loss, wt gain, hemoptysis, hematochezia or melena. Physical Examination:    There were no vitals taken for this visit. General:  Alert, cooperative, no distress. Head:  Normocephalic, without obvious abnormality, atraumatic. Eyes:  Conjunctivae/corneas clear. Pupils equal, round, reactive to light. Chest wall:  No tenderness or deformity. Extremities: Extremities normal, atraumatic, no cyanosis or edema. Skin:         Lymph nodes: Cervical and supraclavicular nodes are normal.   Neurologic: Moves all extremities, fluent speech         ASSESSMENT AND PLAN:    1. Zoster:  Healing phase. Famvir 500 mg TID for one week. RTC if sx do not improve. Anticipate that she may experience lethargy, fatigue and pain for up to a month. Orders Placed This Encounter    bumetanide (BUMEX) 0.5 mg tablet     Sig: Take 1 mg by mouth daily.  famciclovir (FAMVIR) 500 mg tablet     Sig: Take 1 Tab by mouth three (3) times daily for 7 days.      Dispense:  21 Tab     Refill:  0       [place] Rachana Bailey MD, [place]

## 2017-06-01 DIAGNOSIS — I11.9 HYPERTENSIVE HEART DISEASE WITHOUT HEART FAILURE: ICD-10-CM

## 2017-06-01 DIAGNOSIS — R53.1 WEAKNESS: ICD-10-CM

## 2017-06-01 DIAGNOSIS — Z85.118 HISTORY OF LUNG CANCER IN ADULTHOOD: ICD-10-CM

## 2017-06-27 ENCOUNTER — OFFICE VISIT (OUTPATIENT)
Dept: FAMILY MEDICINE CLINIC | Age: 82
End: 2017-06-27

## 2017-06-27 VITALS
RESPIRATION RATE: 16 BRPM | HEART RATE: 72 BPM | WEIGHT: 118 LBS | OXYGEN SATURATION: 93 % | DIASTOLIC BLOOD PRESSURE: 66 MMHG | SYSTOLIC BLOOD PRESSURE: 124 MMHG | BODY MASS INDEX: 19.64 KG/M2

## 2017-06-27 DIAGNOSIS — B02.29 POST HERPETIC NEURALGIA: ICD-10-CM

## 2017-06-27 DIAGNOSIS — R60.0 BILATERAL LEG EDEMA: Primary | ICD-10-CM

## 2017-06-27 DIAGNOSIS — R60.0 BILATERAL LEG EDEMA: ICD-10-CM

## 2017-06-27 RX ORDER — HYDROCODONE BITARTRATE AND ACETAMINOPHEN 5; 325 MG/1; MG/1
1 TABLET ORAL
Qty: 20 TAB | Refills: 0 | Status: SHIPPED | OUTPATIENT
Start: 2017-06-27 | End: 2017-07-12

## 2017-06-27 RX ORDER — POTASSIUM CHLORIDE 750 MG/1
10 TABLET, EXTENDED RELEASE ORAL DAILY
Qty: 30 TAB | Refills: 5 | Status: SHIPPED | OUTPATIENT
Start: 2017-06-27 | End: 2017-07-12

## 2017-06-27 NOTE — PROGRESS NOTES
Chief Complaint   Patient presents with    Skin Problem     starts in the middle of the night, burning         HPI:       is a 80 y.o. female retired  who lives alone. Remote history of treatment for a B cell lymphoma (chemo). There is longstanding CRI, compensated CHF, HTN, pacemaker implantation and shingles. For the past several months she has required daily diuretic therapy to manage her CHF and leg edema, which, at times, was massive and oozing. This is no longer the case. Complains of fatigue. No Known Allergies    Current Outpatient Prescriptions   Medication Sig    HYDROcodone-acetaminophen (NORCO) 5-325 mg per tablet Take 1 Tab by mouth nightly as needed for Pain. Max Daily Amount: 1 Tab.  bumetanide (BUMEX) 0.5 mg tablet Take 1 mg by mouth daily.  polyethylene glycol (MIRALAX) 17 gram/dose powder Take 17 g by mouth two (2) times a day. Indications: Constipation    naproxen sodium (ALEVE) 220 mg cap Take  by mouth daily as needed.  prednisoLONE acetate (PRED FORTE) 1 % ophthalmic suspension Administer 1 Drop to left eye four (4) times daily.  triamcinolone acetonide (KENALOG) 0.1 % topical cream Apply  to affected area two (2) times daily as needed for Skin Irritation.  PHENYLEPH/MINERAL OIL/PETROLAT (PREPARATION H RE) Insert  into rectum.  magnesium hydroxide (MILK OF MAGNESIA) 400 mg/5 mL suspension Take 30 mL by mouth daily as needed for Constipation.  OTHER,NON-FORMULARY, Indications: Moisture cream with colloidal oatmeal    pilocarpine (PILOCAR) 4 % ophthalmic solution Administer 1 Drop to left eye four (4) times daily.  dorzolamide-timolol (COSOPT) 2-0.5 % ophthalmic solution Administer 1 Drop to left eye two (2) times a day.  bimatoprost (LUMIGAN) 0.03 % ophthalmic drops Administer 1 Drop to left eye nightly.  potassium chloride (KLOR-CON) 10 mEq tablet Take 1 Tab by mouth daily.     hydrOXYzine HCl (ATARAX) 25 mg tablet Take 1 Tab by mouth nightly as needed for Itching. Indications: PRURITUS OF SKIN    lactulose (CHRONULAC) 10 gram/15 mL solution Take 20 g by mouth two (2) times a day.  senna-docusate (PERICOLACE) 8.6-50 mg per tablet Take 1 Tab by mouth daily. No current facility-administered medications for this visit. 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 Denies wt loss, wt gain, hemoptysis, hematochezia or melena. Physical Examination:    /66 (BP 1 Location: Left arm, BP Patient Position: Sitting)  Pulse 72  Resp 16  Wt 118 lb (53.5 kg)  SpO2 93%  BMI 19.64 kg/m2    General: Alert and Ox3, Fluent speech  HEENT:  NC/AT, EOMI, OP: clear  Neck:  Supple, no adenopathy, JVD, mass or bruit  Chest:  Clear to Ausculation, without wheezes, rales, rubs or ronchi  Cardiac: RRR with a 3/6 THERON  Abdomen:  +BS, soft, nontender without palpable HSM  Extremities:  No cyanosis, clubbing or edema  Neurologic:  Ambulatory without assist, CN 2-12 grossly intact. Moves all extremities. Skin: no rash  Lymphadenopathy: no cervical or supraclavicular nodes      ASSESSMENT AND PLAN:     1. She looks dry today. Her legs appear normal--more so than they have for years. She will go home and weigh on her scale and we will establish this as her \"dry\" weight.     2. Discontinue Bumex and KCl now  3. Reassess weight in 2 weeks. 4.  Post herpetic neuralgia:  Small refill on her Hydrocodone    Orders Placed This Encounter    HYDROcodone-acetaminophen (NORCO) 5-325 mg per tablet     Sig: Take 1 Tab by mouth nightly as needed for Pain. Max Daily Amount: 1 Tab.      Dispense:  20 Tab     Refill:  0       Sai Ge MD, [place]

## 2017-06-27 NOTE — MR AVS SNAPSHOT
Visit Information Date & Time Provider Department Dept. Phone Encounter #  
 6/27/2017  3:30 PM Ramya Isaac MD [address] [place] [address] [id number] Your Appointments 7/12/2017  3:30 PM  
ESTABLISHED PATIENT with MD Carlos Hernandez 38 ([address]) Appt Note: PER GUERREROSLER  
 [address] [address] [phone]  
  
   
 [address] [address]  
  
    
 8/8/2017  3:00 PM  
ESTABLISHED PATIENT with MD Carlos Hernandez 38 ([address]) Appt Note: 1 MO F/U  
 [address] [address] [phone]  
  
    
 11/17/2017 11:00 AM  
ESTABLISHED PATIENT with Coco Pollock MD  
[address] [place] - [facility] [address]) Appt Note: MEDTRONIC PACEMAKER CHECK AND 6 MO FU $0CP  
 [address] [phone]  
  
   
 [address] [address] 7/31/2017  8:00 AM  
REMOTE OFFICE VISIT with [facility] [address]) Appt Note: NOT AN OFFICE VISIT - REMOTE MDT PM  
 [address]  
[phone] [address] Upcoming Health Maintenance Date Due INFLUENZA AGE 9 TO ADULT 8/1/2017 MEDICARE YEARLY EXAM 4/26/2018 GLAUCOMA SCREENING Q2Y 3/31/2019 DTaP/Tdap/Td series (2 - Td) 4/25/2027 Allergies as of 6/27/2017  Review Complete On: 6/27/2017 By: Anette Dobbs RN No Known Allergies Current Immunizations  Reviewed on 6/9/2016 Name Date Influenza High Dose Vaccine PF 11/22/2016  4:35 PM  
 Influenza Vaccine 10/7/2015 Pneumococcal Conjugate (PCV-13) 4/25/2017  4:01 PM  
 Pneumococcal Polysaccharide (PPSV-23) 9/15/2009 Td 8/31/2009 Not reviewed this visit You Were Diagnosed With   
  
 Codes Comments Bilateral leg edema    -  Primary ICD-10-CM: R60.0 ICD-9-CM: 782.3 Post herpetic neuralgia     ICD-10-CM: B02.29 ICD-9-CM: 053.19 Vitals BP Pulse Resp Weight(growth percentile) SpO2 BMI  
 124/66 (BP 1 Location: Left arm, BP Patient Position: Sitting) 72 16 118 lb (53.5 kg) 93% 19.64 kg/m2 Smoking Status Never Smoker BMI and BSA Data Body Mass Index Body Surface Area 19.64 kg/m 2 1.57 m 2 Preferred Pharmacy Pharmacy Name [facility] [place] [address] [phone] Your Updated Medication List  
  
   
This list is accurate as of: 6/27/17  4:26 PM.  Always use your most recent med list.  
  
  
  
  
 ALEVE 220 mg Cap Generic drug:  naproxen sodium Take  by mouth daily as needed. bumetanide 0.5 mg tablet Commonly known as:  Katherine Sierras Take 1 mg by mouth daily. COSOPT 22.3-6.8 mg/mL ophthalmic solution Generic drug:  dorzolamide-timolol Administer 1 Drop to left eye two (2) times a day. HYDROcodone-acetaminophen 5-325 mg per tablet Commonly known as:  Daina Dus Take 1 Tab by mouth nightly as needed for Pain. Max Daily Amount: 1 Tab. hydrOXYzine HCl 25 mg tablet Commonly known as:  ATARAX Take 1 Tab by mouth nightly as needed for Itching. Indications: PRURITUS OF SKIN  
  
 lactulose 10 gram/15 mL solution Commonly known as:  Nelson Hiss Take 20 g by mouth two (2) times a day. LUMIGAN 0.03 % ophthalmic drops Generic drug:  bimatoprost  
Administer 1 Drop to left eye nightly.  
  
 magnesium hydroxide 400 mg/5 mL suspension Commonly known as:  MILK OF MAGNESIA Take 30 mL by mouth daily as needed for Constipation. OTHER(NON-FORMULARY) Indications: Moisture cream with colloidal oatmeal  
  
 pilocarpine 4 % ophthalmic solution Commonly known as:  PILOCAR Administer 1 Drop to left eye four (4) times daily. polyethylene glycol 17 gram/dose powder Commonly known as:  Nestor Swanson Take 17 g by mouth two (2) times a day. Indications: Constipation  
  
 potassium chloride 10 mEq tablet Commonly known as:  KLOR-CON Take 1 Tab by mouth daily. prednisoLONE acetate 1 % ophthalmic suspension Commonly known as:  PRED FORTE Administer 1 Drop to left eye four (4) times daily. PREPARATION H RE Insert  into rectum. senna-docusate 8.6-50 mg per tablet Commonly known as:  Woody Bugler Take 1 Tab by mouth daily. triamcinolone acetonide 0.1 % topical cream  
Commonly known as:  KENALOG Apply  to affected area two (2) times daily as needed for Skin Irritation. Prescriptions Printed Refills HYDROcodone-acetaminophen (NORCO) 5-325 mg per tablet 0 Sig: Take 1 Tab by mouth nightly as needed for Pain. Max Daily Amount: 1 Tab. Class: Print Route: Oral  
  
Introducing [facility] & HEALTH SERVICES! Prudence Du introduces [facility] patient portal. Now you can access parts of your medical record, email your doctor's office, and request medication refills online. 1. In your internet browser, go to [email or web address] 2. Click on the First Time User? Click Here link in the Sign In box. You will see the New Member Sign Up page. 3. Enter your [facility] Access Code exactly as it appears below. You will not need to use this code after youve completed the sign-up process. If you do not sign up before the expiration date, you must request a new code. · [facility] Access Code: [email or web address] Expires: 7/20/2017  1:02 PM 
 
4. Enter the last four digits of your Social Security Number (xxxx) and Date of Birth (mm/dd/yyyy) as indicated and click Submit. You will be taken to the next sign-up page. 5. Create a beqomt ID. This will be your [facility] login ID and cannot be changed, so think of one that is secure and easy to remember. 6. Create a beqomt password. You can change your password at any time. 7. Enter your Password Reset Question and Answer. This can be used at a later time if you forget your password. 8. Enter your e-mail address. You will receive e-mail notification when new information is available in [address] 9. Click Sign Up. You can now view and download portions of your medical record. 10. Click the Download Summary menu link to download a portable copy of your medical information. If you have questions, please visit the Frequently Asked Questions section of the [employer] website. Remember, [employer] is NOT to be used for urgent needs. For medical emergencies, dial 911. Now available from your iPhone and Android! Please provide this summary of care documentation to your next provider. Your primary care clinician is listed as Charity Strauss. If you have any questions after today's visit, please call [phone].

## 2017-07-12 ENCOUNTER — OFFICE VISIT (OUTPATIENT)
Dept: FAMILY MEDICINE CLINIC | Age: 82
End: 2017-07-12

## 2017-07-12 VITALS
OXYGEN SATURATION: 99 % | DIASTOLIC BLOOD PRESSURE: 97 MMHG | RESPIRATION RATE: 19 BRPM | BODY MASS INDEX: 20.6 KG/M2 | SYSTOLIC BLOOD PRESSURE: 182 MMHG | HEART RATE: 89 BPM | WEIGHT: 123.8 LBS

## 2017-07-12 DIAGNOSIS — R60.0 BILATERAL EDEMA OF LOWER EXTREMITY: Primary | ICD-10-CM

## 2017-07-12 NOTE — PROGRESS NOTES
Chief Complaint   Patient presents with    Medication Evaluation         HPI:       is a 80 y.o. female retired  who lives alone. Remote history of treatment for a B cell lymphoma (chemo). There is longstanding CRI, compensated CHF, HTN, pacemaker implantation and shingles. For the past several months she has required daily diuretic therapy to manage her CHF and leg edema, which, at times, was massive and oozing. This is no longer the case. Complains of fatigue. Seen 2 weeks ago and diuretic therapy was held. Weight has fluctuated from 118 to 120. Nervous about her visit today. Presently not taking any medications. No Known Allergies    Current Outpatient Prescriptions   Medication Sig    naproxen sodium (ALEVE) 220 mg cap Take  by mouth daily as needed.  prednisoLONE acetate (PRED FORTE) 1 % ophthalmic suspension Administer 1 Drop to left eye four (4) times daily.  triamcinolone acetonide (KENALOG) 0.1 % topical cream Apply  to affected area two (2) times daily as needed for Skin Irritation.  PHENYLEPH/MINERAL OIL/PETROLAT (PREPARATION H RE) Insert  into rectum.  magnesium hydroxide (MILK OF MAGNESIA) 400 mg/5 mL suspension Take 30 mL by mouth daily as needed for Constipation.  OTHER,NON-FORMULARY, Indications: Moisture cream with colloidal oatmeal    pilocarpine (PILOCAR) 4 % ophthalmic solution Administer 1 Drop to left eye four (4) times daily.  dorzolamide-timolol (COSOPT) 2-0.5 % ophthalmic solution Administer 1 Drop to left eye two (2) times a day.  bimatoprost (LUMIGAN) 0.03 % ophthalmic drops Administer 1 Drop to left eye nightly. No current facility-administered medications for this visit.         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 Denies wt loss, wt gain, hemoptysis, hematochezia or melena. Physical Examination:    BP (!) 182/97 (BP 1 Location: Left arm, BP Patient Position: Sitting)  Pulse 89  Resp 19  Wt 123 lb 12.8 oz (56.2 kg)  SpO2 99%  BMI 20.6 kg/m2    General: Alert and Ox3, Fluent speech  HEENT:  NC/AT, EOMI, OP: clear  Neck:  Supple, no adenopathy, JVD, mass or bruit  Chest:  Clear to Ausculation, without wheezes, rales, rubs or ronchi  Cardiac: RRR with a 2/6 THERON  Abdomen:  +BS, soft, nontender without palpable HSM  Extremities:  No cyanosis, clubbing or edema  Neurologic:  Ambulatory without assist, CN 2-12 grossly intact. Moves all extremities. Skin: no rash, although skin is dry  Lymphadenopathy: no cervical or supraclavicular nodes      ASSESSMENT AND PLAN:     1. BLE edema:  Stable off meds. Needs daily weights  2. RTC in a month  3. Will monitor BP    No orders of the defined types were placed in this encounter.       Alia Martinez MD, [address]

## 2017-07-12 NOTE — MR AVS SNAPSHOT
Visit Information Date & Time Provider Department Dept. Phone Encounter #  
 7/12/2017  3:30 PM Karen Acuna MD [address] [place] [address] [id number] Your Appointments 8/8/2017  3:00 PM  
ESTABLISHED PATIENT with Karen Acuna MD  
Carlos 38 ([facility]) Appt Note: 1 MO F/U  
 1000 [facility] [address] [facility],5[address] [address] [phone]  
  
   
 [address] [address]  
  
    
 11/17/2017 11:00 AM  
ESTABLISHED PATIENT with Marine Arce MD  
[address] G[address] [place] - [facility]) Appt Note: MEDTRONIC PACEMAKER CHECK AND 6 MO FU $0CP  
 [address] [address] [phone]  
  
   
 [address] [address] 7/31/2017  8:00 AM  
REMOTE OFFICE VISIT with [facility]) Appt Note: NOT AN OFFICE VISIT - REMOTE MDT PM  
 [facility]  
[phone] [facility] Upcoming Health Maintenance Date Due INFLUENZA AGE 9 TO ADULT 8/1/2017 MEDICARE YEARLY EXAM 4/26/2018 GLAUCOMA SCREENING Q2Y 3/31/2019 DTaP/Tdap/Td series (2 - Td) 4/25/2027 Allergies as of 7/12/2017  Review Complete On: 7/12/2017 By: Karen Acuna MD  
 No Known Allergies Current Immunizations  Reviewed on 7/12/2017 Name Date Influenza High Dose Vaccine PF 11/22/2016  4:35 PM  
 Influenza Vaccine 10/7/2015 Pneumococcal Conjugate (PCV-13) 4/25/2017  4:01 PM  
 Pneumococcal Polysaccharide (PPSV-23) 9/15/2009 Td 8/31/2009 Reviewed by Cameron Cisneros on 7/12/2017 at  3:16 PM  
Vitals BP Pulse Resp Weight(growth percentile) SpO2 BMI  
 (!) 182/97 (BP 1 Location: Left arm, BP Patient Position: Sitting) 89 19 123 lb 12.8 oz (56.2 kg) 99% 20.6 kg/m2 Smoking Status Never Smoker Vitals History BMI and BSA Data Body Mass Index Body Surface Area  
 20.6 kg/m 2 1.61 m 2 Preferred Pharmacy Pharmacy Name Phone [facility] - [facility] [address] [phone] Your Updated Medication List  
  
   
This list is accurate as of: 7/12/17  4:10 PM.  Always use your most recent med list.  
  
  
  
  
 ALEVE 220 mg Cap Generic drug:  naproxen sodium Take  by mouth daily as needed. COSOPT 22.3-6.8 mg/mL ophthalmic solution Generic drug:  dorzolamide-timolol Administer 1 Drop to left eye two (2) times a day. LUMIGAN 0.03 % ophthalmic drops Generic drug:  bimatoprost  
Administer 1 Drop to left eye nightly.  
  
 magnesium hydroxide 400 mg/5 mL suspension Commonly known as:  MILK OF MAGNESIA Take 30 mL by mouth daily as needed for Constipation. OTHER(NON-FORMULARY) Indications: Moisture cream with colloidal oatmeal  
  
 pilocarpine 4 % ophthalmic solution Commonly known as:  PILOCAR Administer 1 Drop to left eye four (4) times daily. prednisoLONE acetate 1 % ophthalmic suspension Commonly known as:  PRED FORTE Administer 1 Drop to left eye four (4) times daily. PREPARATION H RE Insert  into rectum. triamcinolone acetonide 0.1 % topical cream  
Commonly known as:  KENALOG Apply  to affected area two (2) times daily as needed for Skin Irritation. Introducing [facility] & HEALTH SERVICES! Theodore Oro introduces [employer] patient portal. Now you can access parts of your medical record, email your doctor's office, and request medication refills online. 1. In your internet browser, go to [email or web address] [employer] 2. Click on the First Time User? Click Here link in the Sign In box. You will see the New Member Sign Up page. 3. Enter your [employer] Access Code exactly as it appears below. You will not need to use this code after youve completed the sign-up process.  If you do not sign up before the expiration date, you must request a new code. · [employer] Access Code: [email or web address] Expires: 7/20/2017  1:02 PM 
 
4. Enter the last four digits of your Social Security Number (xxxx) and Date of Birth (mm/dd/yyyy) as indicated and click Submit. You will be taken to the next sign-up page. 5. Create a [employer] ID. This will be your [employer] login ID and cannot be changed, so think of one that is secure and easy to remember. 6. Create a [employer] password. You can change your password at any time. 7. Enter your Password Reset Question and Answer. This can be used at a later time if you forget your password. 8. Enter your e-mail address. You will receive e-mail notification when new information is available in [address] 9. Click Sign Up. You can now view and download portions of your medical record. 10. Click the Download Summary menu link to download a portable copy of your medical information. If you have questions, please visit the Frequently Asked Questions section of the [employer] website. Remember, [employer] is NOT to be used for urgent needs. For medical emergencies, dial 911. Now available from your iPhone and Android! Please provide this summary of care documentation to your next provider. Your primary care clinician is listed as Anastasiya Munoz. If you have any questions after today's visit, please call [phone].

## 2017-07-31 ENCOUNTER — OFFICE VISIT (OUTPATIENT)
Dept: CARDIOLOGY CLINIC | Age: 82
End: 2017-07-31

## 2017-07-31 DIAGNOSIS — I44.2 ATRIOVENTRICULAR BLOCK, COMPLETE (HCC): ICD-10-CM

## 2017-07-31 DIAGNOSIS — Z95.0 PACEMAKER: Primary | ICD-10-CM

## 2017-07-31 NOTE — PROGRESS NOTES
See device report - MDT DCPM remote send, next remote send in 6 months, in office check in 3 months at the [place] office.

## 2017-08-08 ENCOUNTER — OFFICE VISIT (OUTPATIENT)
Dept: FAMILY MEDICINE CLINIC | Age: 82
End: 2017-08-08

## 2017-08-08 VITALS
DIASTOLIC BLOOD PRESSURE: 70 MMHG | BODY MASS INDEX: 20.4 KG/M2 | RESPIRATION RATE: 16 BRPM | SYSTOLIC BLOOD PRESSURE: 134 MMHG | WEIGHT: 122.6 LBS | OXYGEN SATURATION: 92 % | HEART RATE: 86 BPM

## 2017-08-08 DIAGNOSIS — D22.9 SUSPICIOUS NEVUS: ICD-10-CM

## 2017-08-08 DIAGNOSIS — D04.62 CARCINOMA IN SITU OF SKIN OF LEFT UPPER EXTREMITY INCLUDING SHOULDER: ICD-10-CM

## 2017-08-08 DIAGNOSIS — R60.0 BILATERAL LEG EDEMA: Primary | ICD-10-CM

## 2017-08-08 RX ORDER — BUMETANIDE 0.5 MG/1
TABLET ORAL DAILY
COMMUNITY
End: 2017-11-17 | Stop reason: ALTCHOICE

## 2017-08-08 NOTE — MR AVS SNAPSHOT
Visit Information Date & Time Provider Department Dept. Phone Encounter #  
 8/8/2017  3:00 PM Bishnu Robles MD [address] [place] [address] [id number] Your Appointments 9/19/2017  3:00 PM  
ESTABLISHED PATIENT with Bishnu Robles MD  
Breivangerma 38 ([facility]) Appt Note: 6 wk FU  
 [address] [address] [facility],5[address] [address] [phone]  
  
   
 [address] [address]  
  
    
 11/17/2017 11:00 AM  
ESTABLISHED PATIENT with Ginger Nichole MD  
[address] [place] - [facility] [place] [facility]) Appt Note: MEDTRONIC PACEMAKER CHECK AND 6 MO FU $0CP  
 [address] [facility] [address] [address] [phone]  
  
   
 [address] [address] 2/19/2018  8:00 AM  
REMOTE OFFICE VISIT with [facility]) Appt Note: NOT AN OFFICE VISIT - REMOTE MDT PM  
 [address] [facility]  
[phone] [address] [facility] Upcoming Health Maintenance Date Due INFLUENZA AGE 9 TO ADULT 8/1/2017 MEDICARE YEARLY EXAM 4/26/2018 GLAUCOMA SCREENING Q2Y 3/31/2019 DTaP/Tdap/Td series (2 - Td) 4/25/2027 Allergies as of 8/8/2017  Review Complete On: 8/8/2017 By: Bishnu Robles MD  
 No Known Allergies Current Immunizations  Reviewed on 7/12/2017 Name Date Influenza High Dose Vaccine PF 11/22/2016  4:35 PM  
 Influenza Vaccine 10/7/2015 Pneumococcal Conjugate (PCV-13) 4/25/2017  4:01 PM  
 Pneumococcal Polysaccharide (PPSV-23) 9/15/2009 Td 8/31/2009 Not reviewed this visit Vitals BP Pulse Resp Weight(growth percentile) SpO2 BMI  
 134/70 (BP 1 Location: Left arm, BP Patient Position: Sitting) 86 16 122 lb 9.6 oz (55.6 kg) 92% 20.4 kg/m2 Smoking Status Never Smoker BMI and BSA Data Body Mass Index Body Surface Area  
 20.4 kg/m 2 1.6 m 2 Preferred Pharmacy Pharmacy Name Phone [facility] - [facility] [address] [phone] Your Updated Medication List  
  
   
This list is accurate as of: 8/8/17  3:37 PM.  Always use your most recent med list.  
  
  
  
  
 ALEVE 220 mg Cap Generic drug:  naproxen sodium Take  by mouth daily as needed. bumetanide 0.5 mg tablet Commonly known as:  Dany Dilling Take  by mouth daily. COSOPT 22.3-6.8 mg/mL ophthalmic solution Generic drug:  dorzolamide-timolol Administer 1 Drop to left eye two (2) times a day. LUMIGAN 0.03 % ophthalmic drops Generic drug:  bimatoprost  
Administer 1 Drop to left eye nightly.  
  
 magnesium hydroxide 400 mg/5 mL suspension Commonly known as:  MILK OF MAGNESIA Take 30 mL by mouth daily as needed for Constipation. OTHER(NON-FORMULARY) Indications: Moisture cream with colloidal oatmeal  
  
 pilocarpine 4 % ophthalmic solution Commonly known as:  PILOCAR Administer 1 Drop to left eye four (4) times daily. prednisoLONE acetate 1 % ophthalmic suspension Commonly known as:  PRED FORTE Administer 1 Drop to left eye four (4) times daily. PREPARATION H RE Insert  into rectum. triamcinolone acetonide 0.1 % topical cream  
Commonly known as:  KENALOG Apply  to affected area two (2) times daily as needed for Skin Irritation. Introducing [facility] & HEALTH SERVICES! [employer] introduces [facility] patient portal. Now you can access parts of your medical record, email your doctor's office, and request medication refills online. 1. In your internet browser, go to [email or web address] 2. Click on the First Time User? Click Here link in the Sign In box. You will see the New Member Sign Up page. 3. Enter your [facility] Access Code exactly as it appears below.  You will not need to use this code after youve completed the sign-up process. If you do not sign up before the expiration date, you must request a new code. · [employer] Access Code: [facility] Expires: 10/22/2017  4:26 PM 
 
4. Enter the last four digits of your Social Security Number (xxxx) and Date of Birth (mm/dd/yyyy) as indicated and click Submit. You will be taken to the next sign-up page. 5. Create a [employer] ID. This will be your [employer] login ID and cannot be changed, so think of one that is secure and easy to remember. 6. Create a [employer] password. You can change your password at any time. 7. Enter your Password Reset Question and Answer. This can be used at a later time if you forget your password. 8. Enter your e-mail address. You will receive e-mail notification when new information is available in [address] 9. Click Sign Up. You can now view and download portions of your medical record. 10. Click the Download Summary menu link to download a portable copy of your medical information. If you have questions, please visit the Frequently Asked Questions section of the [employer] website. Remember, [employer] is NOT to be used for urgent needs. For medical emergencies, dial 911. Now available from your iPhone and Android! Please provide this summary of care documentation to your next provider. Your primary care clinician is listed as Atif Mckeon. If you have any questions after today's visit, please call [phone].

## 2017-08-08 NOTE — PROGRESS NOTES
Chief Complaint   Patient presents with    Ankle swelling         HPI:       is a 80 y.o. female. History of BLE edema. Titrating diuretic dose based on weight. Had to resume the Bumex last week. Weight stabilizing at 125. No CHF, PND or orthopnea. Noticed a lesion in the left upper arm. Bleeds easily and has not healed. No Known Allergies    Current Outpatient Prescriptions   Medication Sig    bumetanide (BUMEX) 0.5 mg tablet Take  by mouth daily.  naproxen sodium (ALEVE) 220 mg cap Take  by mouth daily as needed.  prednisoLONE acetate (PRED FORTE) 1 % ophthalmic suspension Administer 1 Drop to left eye four (4) times daily.  triamcinolone acetonide (KENALOG) 0.1 % topical cream Apply  to affected area two (2) times daily as needed for Skin Irritation.  PHENYLEPH/MINERAL OIL/PETROLAT (PREPARATION H RE) Insert  into rectum.  magnesium hydroxide (MILK OF MAGNESIA) 400 mg/5 mL suspension Take 30 mL by mouth daily as needed for Constipation.  OTHER,NON-FORMULARY, Indications: Moisture cream with colloidal oatmeal    pilocarpine (PILOCAR) 4 % ophthalmic solution Administer 1 Drop to left eye four (4) times daily.  dorzolamide-timolol (COSOPT) 2-0.5 % ophthalmic solution Administer 1 Drop to left eye two (2) times a day.  bimatoprost (LUMIGAN) 0.03 % ophthalmic drops Administer 1 Drop to left eye nightly. No current facility-administered medications for this visit.         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 admission    Constipation     CRI (chronic renal insufficiency)     Dyspepsia and other specified disorders of function of stomach     Frequent urination     Glaucoma  HCVD (hypertensive cardiovascular disease)     Hemorrhoids     History of seasonal allergies     Hypercholesterolemia          ROS:  Denies fever, chills, cough, chest pain, SOB,  nausea, vomiting, or diarrhea. Denies wt loss, wt gain, hemoptysis, hematochezia or melena. Physical Examination:    /70 (BP 1 Location: Left arm, BP Patient Position: Sitting)  Pulse 86  Resp 16  Wt 122 lb 9.6 oz (55.6 kg)  SpO2 92%  BMI 20.4 kg/m2    General: Alert and Ox3, Fluent speech  HEENT:  NC/AT, EOMI, OP: clear  Neck:  Supple, no adenopathy, JVD, mass or bruit  Chest:  Clear to Ausculation, without wheezes, rales, rubs or ronchi  Cardiac: RRR with a 2/6 THERON  Abdomen:  +BS, soft, nontender without palpable HSM  Extremities:  No cyanosis, clubbing or edema  Neurologic:  Ambulatory without assist, CN 2-12 grossly intact. Moves all extremities. Skin: 3 mm spiculated lesion on a firm base on the left upper arm  Lymphadenopathy: no cervical or supraclavicular nodes      Procedure Note:  Shave Biopsy    Location:  Left upper arm  Size:  3 mm    With the patient's verbal permission, the skin directly below the lesion was infiltrated with 1 cc of 1% Lidocaine with Epi using a #31 gauge needle following an alcohol prep. Using a sterile razor, the lesion was removed completely in a plane parallel to the skin. The patient tolerated the procedure well and the specimen was sent to [facility] path. The defect was covered with a sterile bandage. ASSESSMENT AND PLAN:     1. BLE edema:  Continue Bumex at 0.5 mg daily. Weigh several times a week  2. Suspicious lesion left upper arm:  Removed today by shave and sent for path. 3.  RTC in 6 weeks. Orders Placed This Encounter    bumetanide (BUMEX) 0.5 mg tablet     Sig: Take  by mouth daily.        Gerda Tovar MD, [place]

## 2017-08-10 LAB
DX ICD CODE: NORMAL
DX ICD CODE: NORMAL
PATH REPORT.FINAL DX SPEC: NORMAL
PATH REPORT.GROSS SPEC: NORMAL
PATH REPORT.RELEVANT HX SPEC: NORMAL
PATH REPORT.SITE OF ORIGIN SPEC: NORMAL
PATHOLOGIST NAME: NORMAL
PAYMENT PROCEDURE: NORMAL

## 2017-08-16 ENCOUNTER — TELEPHONE (OUTPATIENT)
Dept: FAMILY MEDICINE CLINIC | Age: 82
End: 2017-08-16

## 2017-08-16 NOTE — TELEPHONE ENCOUNTER
Patient is waking in the night itching, asked if she could take Benadryl, due to her age, I suggested trying 1/2 tablet to help her sleep without itching, also warned Jennifer to observe for unsteadiness if she does get up in the night.

## 2017-08-16 NOTE — TELEPHONE ENCOUNTER
[phone] contact # [email or web address] [email or web address] [employer] calling on behalf of Wm Lyles - issues w/itching. Can she take a benadryl at night for the itching? Please give us a call concerning this matter.   Thanks,

## 2017-09-13 RX ORDER — BUMETANIDE 1 MG/1
TABLET ORAL
Qty: 60 TAB | Refills: 2 | Status: SHIPPED | OUTPATIENT
Start: 2017-09-13 | End: 2018-05-13 | Stop reason: SDUPTHER

## 2017-09-19 ENCOUNTER — OFFICE VISIT (OUTPATIENT)
Dept: FAMILY MEDICINE CLINIC | Age: 82
End: 2017-09-19

## 2017-09-19 VITALS
RESPIRATION RATE: 16 BRPM | HEART RATE: 74 BPM | WEIGHT: 125 LBS | BODY MASS INDEX: 20.8 KG/M2 | DIASTOLIC BLOOD PRESSURE: 80 MMHG | SYSTOLIC BLOOD PRESSURE: 152 MMHG | OXYGEN SATURATION: 98 %

## 2017-09-19 DIAGNOSIS — B02.29 POST HERPETIC NEURALGIA: ICD-10-CM

## 2017-09-19 DIAGNOSIS — R60.0 BILATERAL LEG EDEMA: Primary | ICD-10-CM

## 2017-09-19 DIAGNOSIS — Z23 ENCOUNTER FOR IMMUNIZATION: ICD-10-CM

## 2017-09-19 NOTE — MR AVS SNAPSHOT
Visit Information Date & Time Provider Department Dept. Phone Encounter #  
 9/19/2017  3:00 PM Patty Patrick MD [address] [place] [address] [id number] Follow-up Instructions Return in about 3 months (around 12/19/2017). Follow-up and Disposition History Your Appointments 11/17/2017 11:00 AM  
ESTABLISHED PATIENT with Ishan Plasencia MD  
[id number] [facility] [place] - [facility] [address]) Appt Note: [employer] PACEMAKER CHECK AND 6 MO FU $0CP  
 [address] [facility] [address] [id number] [phone]  
  
   
 [address] [address]  
  
    
 11/29/2017  3:00 PM  
ESTABLISHED PATIENT with Patty Patrick MD  
[facility] ([address]) Appt Note: 2 mo f/u  
 [address] [place] [id number] [facility],5[address] [address] [phone]  
  
   
 [address] [address] 2/19/2018  8:00 AM  
REMOTE OFFICE VISIT with [facility] [address]) Appt Note: NOT AN OFFICE VISIT - REMOTE MDT PM  
 [address]  
[phone] [address] Upcoming Health Maintenance Date Due INFLUENZA AGE 9 TO ADULT 8/1/2017 MEDICARE YEARLY EXAM 4/26/2018 GLAUCOMA SCREENING Q2Y 3/31/2019 DTaP/Tdap/Td series (2 - Td) 4/25/2027 Allergies as of 9/19/2017  Review Complete On: 9/19/2017 By: Patty Patrick MD  
 No Known Allergies Current Immunizations  Reviewed on 7/12/2017 Name Date Influenza High Dose Vaccine PF 9/19/2017  3:25 PM, 11/22/2016  4:35 PM  
 Influenza Vaccine 10/7/2015 Pneumococcal Conjugate (PCV-13) 4/25/2017  4:01 PM  
 Pneumococcal Polysaccharide (PPSV-23) 9/15/2009 Td 8/31/2009 Not reviewed this visit You Were Diagnosed With   
  
 Codes Comments Bilateral leg edema    -  Primary ICD-10-CM: R60.0 ICD-9-CM: 782.3 Encounter for immunization     ICD-10-CM: A11 ICD-9-CM: V03.89 Post herpetic neuralgia     ICD-10-CM: B02.29 ICD-9-CM: 053.19 Vitals BP Pulse Resp Weight(growth percentile) SpO2 BMI  
 152/80 (BP 1 Location: Left arm, BP Patient Position: Sitting) 74 16 125 lb (56.7 kg) 98% 20.8 kg/m2 Smoking Status Never Smoker BMI and BSA Data Body Mass Index Body Surface Area  
 20.8 kg/m 2 1.61 m 2 Preferred Pharmacy Pharmacy Name [facility] [place] [address] [phone] Your Updated Medication List  
  
   
This list is accurate as of: 9/19/17  3:53 PM.  Always use your most recent med list.  
  
  
  
  
 ALEVE 220 mg Cap Generic drug:  naproxen sodium Take  by mouth daily as needed. * bumetanide 0.5 mg tablet Commonly known as:  Buddy Pack Take  by mouth daily. * bumetanide 1 mg tablet Commonly known as:  Buddy Pack TAKE 2 TABLETS BY MOUTH DAILY FOR SWELLING  
  
 COSOPT 22.3-6.8 mg/mL ophthalmic solution Generic drug:  dorzolamide-timolol Administer 1 Drop to both eyes two (2) times a day. LUMIGAN 0.03 % ophthalmic drops Generic drug:  bimatoprost  
Administer 1 Drop to left eye nightly.  
  
 magnesium hydroxide 400 mg/5 mL suspension Commonly known as:  MILK OF MAGNESIA Take 30 mL by mouth daily as needed for Constipation. OTHER(NON-FORMULARY) Indications: Moisture cream with colloidal oatmeal  
  
 pilocarpine 4 % ophthalmic solution Commonly known as:  PILOCAR Administer 1 Drop to left eye three (3) times daily. prednisoLONE acetate 1 % ophthalmic suspension Commonly known as:  PRED FORTE Administer 1 Drop to left eye four (4) times daily. PREPARATION H RE Insert  into rectum. triamcinolone acetonide 0.1 % topical cream  
Commonly known as:  KENALOG Apply  to affected area two (2) times daily as needed for Skin Irritation. * Notice: This list has 2 medication(s) that are the same as other medications prescribed for you. Read the directions carefully, and ask your doctor or other care provider to review them with you. We Performed the Following ADMIN INFLUENZA VIRUS VAC [ hospitals] INFLUENZA VIRUS VACCINE, HIGH DOSE SEASONAL, PRESERVATIVE FREE [67751 CPT(R)] Follow-up Instructions Return in about 3 months (around 12/19/2017). Patient Instructions Influenza (Flu) Vaccine: Care Instructions Your Care Instructions Influenza (flu) is an infection in the lungs and breathing passages. It is caused by the influenza virus. There are different strains, or types, of the flu virus every year. The flu comes on quickly. It can cause a cough, stuffy nose, fever, chills, tiredness, and aches and pains. These symptoms may last up to 10 days. The flu can make you feel very sick, but most of the time it doesn't lead to other problems. But it can cause serious problems in people who are older or who have a long-term illness, such as heart disease or diabetes. You can help prevent the flu by getting a flu vaccine every year, as soon as it is available. You cannot get the flu from the vaccine. The vaccine prevents most cases of the flu. But even when the vaccine doesn't prevent the flu, it can make symptoms less severe and reduce the chance of problems from the flu. Sometimes, young children and people who have an immune system problem may have a slight fever or muscle aches or pains 6 to 12 hours after getting the shot. These symptoms usually last 1 or 2 days. Follow-up care is a key part of your treatment and safety. Be sure to make and go to all appointments, and call your doctor if you are having problems. It's also a good idea to know your test results and keep a list of the medicines you take. Who should get the flu vaccine? Everyone age 7 months or older should get a flu vaccine each year.  It lowers the chance of getting and spreading the flu. The vaccine is very important for people who are at high risk for getting other health problems from the flu. This includes: · Anyone 48years of age or older. · People who live in a long-term care center, such as a nursing home. · All children 6 months through 25years of age. · Adults and children 6 months and older who have long-term heart or lung problems, such as asthma. · Adults and children 6 months and older who needed medical care or were in a hospital during the past year because of diabetes, chronic kidney disease, or a weak immune system (including HIV or AIDS). · Women who will be pregnant during the flu season. · People who have any condition that can make it hard to breathe or swallow (such as a brain injury or muscle disorders). · People who can give the flu to others who are at high risk for problems from the flu. This includes all health care workers and close contacts of people age 72 or older. Who should not get the flu vaccine? The person who gives the vaccine may tell you not to get it if you: 
· Have a severe allergy to eggs or any part of the vaccine. · Have had a severe reaction to a flu vaccine in the past. 
· Have had Guillain-Barré syndrome (GBS). · Are sick with a fever. (Get the vaccine when symptoms are gone.) How can you care for yourself at home? · If you or your child has a sore arm or a slight fever after the shot, take an over-the-counter pain medicine, such as acetaminophen (Tylenol) or ibuprofen (Advil, Motrin). Read and follow all instructions on the label. Do not give aspirin to anyone younger than 20. It has been linked to Reye syndrome, a serious illness. · Do not take two or more pain medicines at the same time unless the doctor told you to. Many pain medicines have acetaminophen, which is Tylenol. Too much acetaminophen (Tylenol) can be harmful. When should you call for help? Call 911 anytime you think you may need emergency care. For example, call if after getting the flu vaccine: 
· You have symptoms of a severe reaction to the flu vaccine. Symptoms of a severe reaction may include: ¨ Severe difficulty breathing. ¨ Sudden raised, red areas (hives) all over your body. ¨ Severe lightheadedness. Call your doctor now or seek immediate medical care if after getting the flu vaccine: 
· You think you are having a reaction to the flu vaccine, such as a new fever. Watch closely for changes in your health, and be sure to contact your doctor if you have any problems. Where can you learn more? Go to http://brittany-reyna.info/. Enter V178 in the search box to learn more about \"Influenza (Flu) Vaccine: Care Instructions. \" Current as of: August 1, 2016 Content Version: 11.3 © 4091-5131 [employer]. Care instructions adapted under license by [employer] (which disclaims liability or warranty for this information). If you have questions about a medical condition or this instruction, always ask your healthcare professional. [place] [address] any warranty or liability for your use of this information. If you have any questions regarding [employer], you may call [employer] support at [phone]. Patient Instructions History Introducing [facility]! [facility] introduces Vor[employer]ius patient portal. Now you can access parts of your medical record, email your doctor's office, and request medication refills online. 1. In your internet browser, go to [email or web address] 2. Click on the First Time User? Click Here link in the Sign In box. You will see the New Member Sign Up page. 3. Enter your Vor[employer]ius Access Code exactly as it appears below. You will not need to use this code after youve completed the sign-up process. If you do not sign up before the expiration date, you must request a new code. · LaunchBit Access Code: [facility] Expires: 10/22/2017  4:26 PM 
 
4. Enter the last four digits of your Social Security Number (xxxx) and Date of Birth (mm/dd/yyyy) as indicated and click Submit. You will be taken to the next sign-up page. 5. Create a Megathreadt ID. This will be your LaunchBit login ID and cannot be changed, so think of one that is secure and easy to remember. 6. Create a LaunchBit password. You can change your password at any time. 7. Enter your Password Reset Question and Answer. This can be used at a later time if you forget your password. 8. Enter your e-mail address. You will receive e-mail notification when new information is available in [address] 9. Click Sign Up. You can now view and download portions of your medical record. 10. Click the Download Summary menu link to download a portable copy of your medical information. If you have questions, please visit the Frequently Asked Questions section of the [employer] website. Remember, LaunchBit is NOT to be used for urgent needs. For medical emergencies, dial 911. Now available from your iPhone and Android! Please provide this summary of care documentation to your next provider. Your primary care clinician is listed as Radha Almanzar. If you have any questions after today's visit, please call [phone].

## 2017-09-19 NOTE — PROGRESS NOTES
Chief Complaint   Patient presents with    Shingles         HPI:      Brenda Hardy is a 80 y.o. female. History of BLE edema. Titrating diuretic dose based on weight. Had to resume the Bumex last week. Weight stabilizing at 125. No CHF, PND or orthopnea. New Issues:  Still complaining of post herpetic neuralgia    No Known Allergies    Current Outpatient Prescriptions   Medication Sig    triamcinolone acetonide (KENALOG) 0.1 % topical cream Apply  to affected area two (2) times daily as needed for Skin Irritation.  PHENYLEPH/MINERAL OIL/PETROLAT (PREPARATION H RE) Insert  into rectum.  magnesium hydroxide (MILK OF MAGNESIA) 400 mg/5 mL suspension Take 30 mL by mouth daily as needed for Constipation.  OTHER,NON-FORMULARY, Indications: Moisture cream with colloidal oatmeal    bumetanide (BUMEX) 1 mg tablet TAKE 2 TABLETS BY MOUTH DAILY FOR SWELLING    bumetanide (BUMEX) 0.5 mg tablet Take  by mouth daily.  naproxen sodium (ALEVE) 220 mg cap Take  by mouth daily as needed.  prednisoLONE acetate (PRED FORTE) 1 % ophthalmic suspension Administer 1 Drop to left eye four (4) times daily.  pilocarpine (PILOCAR) 4 % ophthalmic solution Administer 1 Drop to left eye three (3) times daily.  dorzolamide-timolol (COSOPT) 2-0.5 % ophthalmic solution Administer 1 Drop to both eyes two (2) times a day.  bimatoprost (LUMIGAN) 0.03 % ophthalmic drops Administer 1 Drop to left eye nightly. No current facility-administered medications for this visit.         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 Denies wt loss, wt gain, hemoptysis, hematochezia or melena. Physical Examination:    /80 (BP 1 Location: Left arm, BP Patient Position: Sitting)  Pulse 74  Resp 16  Wt 125 lb (56.7 kg)  SpO2 98%  BMI 20.8 kg/m2    General: Alert and Ox3, Fluent speech  HEENT:  NC/AT, EOMI, OP: clear  Neck:  Supple, no adenopathy, JVD, mass or bruit  Chest:  Clear to Ausculation, without wheezes, rales, rubs or ronchi  Cardiac: RRR with a 2/6 THERON  Abdomen:  +BS, soft, nontender without palpable HSM  Extremities:  No cyanosis, clubbing or edema  Neurologic:  Ambulatory without assist, CN 2-12 grossly intact.  Moves all extremities. Skin: dry skin  Lymphadenopathy: no cervical or supraclavicular nodes      ASSESSMENT AND PLAN:     1. BLE edema:  Continue with current diuretic plan. Working well and is based on her daily weight. Jennifer Tubbs is doing an excellent job helping her.   2.  Post herpetic neuralgia:  Not a good candidate for Lyrica or Gabapentin    Orders Placed This Encounter    INFLUENZA VIRUS VACCINE, HIGH DOSE SEASONAL, PRESERVATIVE FREE    ADMIN INFLUENZA VIRUS VAC       Frederick Obregon MD, [address]

## 2017-11-17 ENCOUNTER — CLINICAL SUPPORT (OUTPATIENT)
Dept: CARDIOLOGY CLINIC | Age: 82
End: 2017-11-17

## 2017-11-17 ENCOUNTER — OFFICE VISIT (OUTPATIENT)
Dept: CARDIOLOGY CLINIC | Age: 82
End: 2017-11-17

## 2017-11-17 VITALS
OXYGEN SATURATION: 99 % | SYSTOLIC BLOOD PRESSURE: 118 MMHG | RESPIRATION RATE: 16 BRPM | BODY MASS INDEX: 20.49 KG/M2 | HEIGHT: 65 IN | HEART RATE: 72 BPM | DIASTOLIC BLOOD PRESSURE: 72 MMHG | WEIGHT: 123 LBS

## 2017-11-17 DIAGNOSIS — Z95.0 PACEMAKER: Primary | ICD-10-CM

## 2017-11-17 DIAGNOSIS — I44.2 ATRIOVENTRICULAR BLOCK, COMPLETE (HCC): ICD-10-CM

## 2017-11-17 DIAGNOSIS — I35.1 AORTIC VALVE INSUFFICIENCY, ETIOLOGY OF CARDIAC VALVE DISEASE UNSPECIFIED: ICD-10-CM

## 2017-11-17 DIAGNOSIS — I44.2 ATRIOVENTRICULAR BLOCK, COMPLETE (HCC): Primary | ICD-10-CM

## 2017-11-17 DIAGNOSIS — Z95.0 CARDIAC PACEMAKER IN SITU: ICD-10-CM

## 2017-11-17 DIAGNOSIS — N18.9 CRI (CHRONIC RENAL INSUFFICIENCY), UNSPECIFIED STAGE: ICD-10-CM

## 2017-11-17 DIAGNOSIS — I11.9 HYPERTENSIVE HEART DISEASE WITHOUT HEART FAILURE: ICD-10-CM

## 2017-11-17 DIAGNOSIS — I10 ESSENTIAL HYPERTENSION: ICD-10-CM

## 2017-11-17 DIAGNOSIS — I50.32 DIASTOLIC CHF, CHRONIC (HCC): ICD-10-CM

## 2017-11-17 NOTE — MR AVS SNAPSHOT
Visit Information Date & Time Provider Department Dept. Phone Encounter #  
 11/17/2017 11:00 AM Temo Roberts, [address] [facility] [place] [facility] [phone] [id number] Your Appointments 11/29/2017  3:00 PM  
ESTABLISHED PATIENT with Meera Gardner MD  
[address] ([facility]) Appt Note: 2 mo f/u  
 [address] [id number] [facility],5Th Floor [address] [phone]  
  
   
 [address] [address]  
  
    
 6/22/2018 11:00 AM  
ESTABLISHED PATIENT with Temo Roberts MD  
[address] [place] [facility] [place] [facility] Appt Note: medtronic pacemaker check and 6 mo follow up $0cp [address] [address] [phone]  
  
   
 [address] [place] [id number] 2/19/2018  8:00 AM  
REMOTE OFFICE VISIT with [facility]) Appt Note: NOT AN OFFICE VISIT - REMOTE MDT PM  
 [id number] [facility]  
[phone] [address] [facility] Upcoming Health Maintenance Date Due  
 MEDICARE YEARLY EXAM 4/26/2018 GLAUCOMA SCREENING Q2Y 3/31/2019 DTaP/Tdap/Td series (2 - Td) 4/25/2027 Allergies as of 11/17/2017  Review Complete On: 9/19/2017 By: Meera Gardner MD  
 No Known Allergies Current Immunizations  Reviewed on 7/12/2017 Name Date Influenza High Dose Vaccine PF 9/19/2017  3:25 PM, 11/22/2016  4:35 PM  
 Influenza Vaccine 10/7/2015 Pneumococcal Conjugate (PCV-13) 4/25/2017  4:01 PM  
 Pneumococcal Polysaccharide (PPSV-23) 9/15/2009 Td 8/31/2009 Not reviewed this visit You Were Diagnosed With   
  
 Codes Comments Atrioventricular block, complete (HCC)    -  Primary ICD-10-CM: R88.4 ICD-9-CM: 426.0 Cardiac pacemaker in situ     ICD-10-CM: Z95.0 ICD-9-CM: V45.01  Essential hypertension     ICD-10-CM: I10 
 ICD-9-CM: 401.9 Hypertensive heart disease without heart failure     ICD-10-CM: I11.9 ICD-9-CM: 402.90 Aortic valve insufficiency, etiology of cardiac valve disease unspecified     ICD-10-CM: I35.1 ICD-9-CM: 424.1 Diastolic CHF, chronic (HCC)     ICD-10-CM: I50.32 
ICD-9-CM: 428.32, 428.0   
 CRI (chronic renal insufficiency), unspecified stage     ICD-10-CM: N18.9 ICD-9-CM: 008. 9 Vitals BP Pulse Resp Height(growth percentile) Weight(growth percentile) SpO2  
 118/72 (BP 1 Location: Left arm, BP Patient Position: Sitting) 72 16 5' 5\" (1.651 m) 123 lb (55.8 kg) 99% BMI Smoking Status 20.47 kg/m2 Never Smoker Vitals History BMI and BSA Data Body Mass Index Body Surface Area  
 20.47 kg/m 2 1.6 m 2 Preferred Pharmacy Pharmacy Name Phone [facility] [place] [address] [phone] Your Updated Medication List  
  
   
This list is accurate as of: 11/17/17 11:31 AM.  Always use your most recent med list.  
  
  
  
  
 ALEVE 220 mg Cap Generic drug:  naproxen sodium Take  by mouth daily as needed. bumetanide 1 mg tablet Commonly known as:  Garlon Salen TAKE 2 TABLETS BY MOUTH DAILY FOR SWELLING  
  
 COSOPT 22.3-6.8 mg/mL ophthalmic solution Generic drug:  dorzolamide-timolol Administer 1 Drop to both eyes two (2) times a day. LUMIGAN 0.03 % ophthalmic drops Generic drug:  bimatoprost  
Administer 1 Drop to left eye nightly.  
  
 magnesium hydroxide 400 mg/5 mL suspension Commonly known as:  MILK OF MAGNESIA Take 30 mL by mouth daily as needed for Constipation. OTHER(NON-FORMULARY) Indications: Moisture cream with colloidal oatmeal  
  
 pilocarpine 4 % ophthalmic solution Commonly known as:  PILOCAR Administer 1 Drop to left eye three (3) times daily. prednisoLONE acetate 1 % ophthalmic suspension Commonly known as:  PRED FORTE Administer 1 Drop to left eye four (4) times daily. PREPARATION H RE Insert  into rectum. triamcinolone acetonide 0.1 % topical cream  
Commonly known as:  KENALOG Apply  to affected area two (2) times daily as needed for Skin Irritation. We Performed the Following AMB POC EKG ROUTINE W/ 12 LEADS, INTER & REP [49676 CPT(R)] Introducing [address]! Gloria Gonzalez introduces [employer] Web patient portal. Now you can access parts of your medical record, email your doctor's office, and request medication refills online. 1. In your internet browser, go to [email or web address] 2. Click on the First Time User? Click Here link in the Sign In box. You will see the New Member Sign Up page. 3. Enter your Wee Web Access Code exactly as it appears below. You will not need to use this code after youve completed the sign-up process. If you do not sign up before the expiration date, you must request a new code. · [employer] Access Code: [email or web address] Expires: 2/15/2018 11:31 AM 
 
4. Enter the last four digits of your Social Security Number (xxxx) and Date of Birth (mm/dd/yyyy) as indicated and click Submit. You will be taken to the next sign-up page. 5. Create a Wee Web ID. This will be your Wee Web login ID and cannot be changed, so think of one that is secure and easy to remember. 6. Create a We[employer] Web password. You can change your password at any time. 7. Enter your Password Reset Question and Answer. This can be used at a later time if you forget your password. 8. Enter your e-mail address. You will receive e-mail notification when new information is available in [address] 9. Click Sign Up. You can now view and download portions of your medical record. 10. Click the Download Summary menu link to download a portable copy of your medical information.  
 
If you have questions, please visit the Frequently Asked Questions section of the [employer]. Remember, [employer] is NOT to be used for urgent needs. For medical emergencies, dial 911. Now available from your iPhone and Android! Please provide this summary of care documentation to your next provider. Your primary care clinician is listed as Amy Mixon. If you have any questions after today's visit, please call [phone].

## 2017-11-17 NOTE — PROGRESS NOTES
Renee Wylie is a 80 y.o. female is here for routine f/u and PPM check. No specific CV sx or complaints. Continues to see PCP. Some LE edema, using comp stockings, trying to elevated legs. Only taking one bumex every day (did not take today). F/u planned with Dr. Lizarraga. The patient denies chest pain/ shortness of breath, orthopnea, PND,, palpitations, syncope, presyncope or fatigue.        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    [address]oventricular block, complete ([address]     HCVD (hypertensive cardiovascular disease)     CRI (chronic renal insufficiency)     Aortic valve insufficiency     Carotid disease, bilateral ([address]       Amy Mixon MD  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    B-cell lymphoma ([address]     Bronchitis     Carotid disease, bilateral ([address]     Cholelithiasis 2009    Clostridium difficile colitis July 17, 2016    [facility] admission    Constipation     CRI (chronic renal insufficiency)     Dyspepsia and other specified disorders of function of stomach     Frequent urination     Glaucoma     HCVD (hypertensive cardiovascular disease)     Hemorrhoids     History of seasonal allergies     Hypercholesterolemia       Past Surgical History:   Procedure Laterality Date    HX HIP FRACTURE TX Left     HX LOBECTOMY Right 11/1998    middle    HX PACEMAKER  2003    DDD    HX PACEMAKER  5/2012    Gen Change     No Known Allergies   Family History   Problem Relation Age of Onset    Heart Attack Sister     Coronary Artery Disease Mother     Heart Attack Mother     Cancer Mother      lung    Heart Disease Mother      Renal failure    Hypertension Mother     Heart Attack Father       Social History     Social History    Marital status: SINGLE     Spouse name: N/A    Number of children: N/A    Years of education: N/A     Occupational History    office work Retired     Social History Main Topics    Smoking status: Never Smoker    Smokeless tobacco: Never Used    Alcohol use No    Drug use: Not on file    Sexual activity: Not on file     Other Topics Concern     Service No    Blood Transfusions No    Caffeine Concern No    Occupational Exposure No    Hobby Hazards No    Sleep Concern No    Stress Concern No    Weight Concern No    Special Diet No    Back Care No    Exercise No     still lives alone and cares for home    Bike Helmet No    Seat Belt Yes    Self-Exams No     Social History Narrative    ** Merged History Encounter **           Current Outpatient Prescriptions   Medication Sig    bumetanide (BUMEX) 1 mg tablet TAKE 2 TABLETS BY MOUTH DAILY FOR SWELLING    bumetanide (BUMEX) 0.5 mg tablet Take  by mouth daily.  naproxen sodium (ALEVE) 220 mg cap Take  by mouth daily as needed.  prednisoLONE acetate (PRED FORTE) 1 % ophthalmic suspension Administer 1 Drop to left eye four (4) times daily.  triamcinolone acetonide (KENALOG) 0.1 % topical cream Apply  to affected area two (2) times daily as needed for Skin Irritation.  PHENYLEPH/MINERAL OIL/PETROLAT (PREPARATION H RE) Insert  into rectum.  magnesium hydroxide (MILK OF MAGNESIA) 400 mg/5 mL suspension Take 30 mL by mouth daily as needed for Constipation.  OTHER,NON-FORMULARY, Indications: Moisture cream with colloidal oatmeal    pilocarpine (PILOCAR) 4 % ophthalmic solution Administer 1 Drop to left eye three (3) times daily.  dorzolamide-timolol (COSOPT) 2-0.5 % ophthalmic solution Administer 1 Drop to both eyes two (2) times a day.  bimatoprost (LUMIGAN) 0.03 % ophthalmic drops Administer 1 Drop to left eye nightly. No current facility-administered medications for this visit. Review of Symptoms:    CONST  No weight change. No fever, chills, sweats    ENT No visual changes, URI sx, sore throat    CV  See HPI   RESP  No cough, or sputum, wheezing. Also see HPI   GI  No abdominal pain or change in bowel habits. No heartburn or dysphagia. No melena or rectal bleeding.   No dysuria, urgency, frequency, hematuria   MSKEL  No joint pain, swelling. No muscle pain. SKIN  No rash or lesions. NEURO  No headache, syncope, or seizure. No weakness, loss of sensation, or paresthesias. PSYCH  No low mood or depression  No anxiety. HE/LYMPH  No easy bruising, abnormal bleeding, or enlarged glands. Physical ExamPhysical Exam:    There were no vitals taken for this visit.   Gen: NAD  HEENT:  PERRL, throat clear  Neck: no adenopathy, no thyromegaly, no JVD   Heart:  Regular,Nl S1S2,  no murmur, gallop or rub.   Lungs:  clear  Abdomen:   Soft, non-tender, bowel sounds are active.   Extremities:  Mild edema  Pulse: symmetric  Neuro: A&O times 3, No focal neuro deficits    Cardiographics    ECG: NSR, PVC's, LBBB/IVCD, LAE      Labs:   Lab Results   Component Value Date/Time    Sodium 142 03/16/2016 03:57 PM    Sodium 141 01/04/2016 11:28 AM    Sodium 142 12/04/2015 09:15 AM    Potassium 5.0 03/16/2016 03:57 PM    Potassium 4.3 01/04/2016 11:28 AM    Potassium 4.5 12/04/2015 09:15 AM    Chloride 104 03/16/2016 03:57 PM    Chloride 102 01/04/2016 11:28 AM    Chloride 104 12/04/2015 09:15 AM    CO2 24 03/16/2016 03:57 PM    CO2 26 01/04/2016 11:28 AM    CO2 26 12/04/2015 09:15 AM    Glucose 95 03/16/2016 03:57 PM    Glucose 90 01/04/2016 11:28 AM    Glucose 90 12/04/2015 09:15 AM    BUN 38 03/16/2016 03:57 PM    BUN 33 01/04/2016 11:28 AM    BUN 44 12/04/2015 09:15 AM    Creatinine 1.63 03/16/2016 03:57 PM    Creatinine 1.46 01/04/2016 11:28 AM    Creatinine 1.45 12/04/2015 09:15 AM    BUN/Creatinine ratio 23 03/16/2016 03:57 PM    BUN/Creatinine ratio 23 01/04/2016 11:28 AM    BUN/Creatinine ratio 30 12/04/2015 09:15 AM    GFR est AA 31 03/16/2016 03:57 PM    GFR est AA 35 01/04/2016 11:28 AM    GFR est AA 35 12/04/2015 09:15 AM    GFR est non-AA 27 03/16/2016 03:57 PM    GFR est non-AA 30 01/04/2016 11:28 AM    GFR est non-AA 31 12/04/2015 09:15 AM    Calcium 9.5 03/16/2016 03:57 PM    Calcium 9.7 01/04/2016 11:28 AM    Calcium 9.1 12/04/2015 09:15 AM    Bilirubin, total 0.4 03/16/2016 03:57 PM    Bilirubin, total 0.4 12/04/2015 09:15 AM    AST (SGOT) 17 03/16/2016 03:57 PM    AST (SGOT) 22 12/04/2015 09:15 AM    Alk. phosphatase 87 03/16/2016 03:57 PM    Alk. phosphatase 72 12/04/2015 09:15 AM    Protein, total 7.1 03/16/2016 03:57 PM    Protein, total 6.4 12/04/2015 09:15 AM    Albumin 4.1 03/16/2016 03:57 PM    Albumin 4.0 12/04/2015 09:15 AM    A-G Ratio 1.4 03/16/2016 03:57 PM    A-G Ratio 1.7 12/04/2015 09:15 AM    ALT (SGPT) 7 03/16/2016 03:57 PM    ALT (SGPT) 10 12/04/2015 09:15 AM     No results found for: CPK, CPKX, CPX  Lab Results   Component Value Date/Time    Cholesterol, total 187 03/16/2016 03:57 PM    HDL Cholesterol 54 03/16/2016 03:57 PM    LDL, calculated 123 03/16/2016 03:57 PM    Triglyceride 51 03/16/2016 03:57 PM     No results found for this or any previous visit. 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 No specific CV sx or complaints. Continues to see PCP. Some LE edema, using comp stockings, trying to elevated legs.   Only taking one bumex every day (did not take today). F/u planned with Dr. Jeffrey Diana. Plan:     Doing well with no adverse cardiac symptoms. Pacemaker check today in office ok (see separate scanned report). Lipids and labs followed by PCP. Continue current care and f/u in 6 months.     Amita Reddy MD

## 2017-11-17 NOTE — PROGRESS NOTES
Chief Complaint   Patient presents with    Hypertension     6 month follow up and medtronic pacemaker check    Pacemaker Check     1. Have you been to the ER, urgent care clinic since your last visit? Hospitalized since your last visit? NO    2. Have you seen or consulted any other health care providers outside of the [address] [place] [address] since your last visit? Include any pap smears or colon screening.  NO

## 2017-11-29 ENCOUNTER — OFFICE VISIT (OUTPATIENT)
Dept: FAMILY MEDICINE CLINIC | Age: 82
End: 2017-11-29

## 2017-11-29 VITALS
WEIGHT: 124.8 LBS | BODY MASS INDEX: 20.79 KG/M2 | HEART RATE: 80 BPM | RESPIRATION RATE: 15 BRPM | HEIGHT: 65 IN | OXYGEN SATURATION: 97 % | DIASTOLIC BLOOD PRESSURE: 70 MMHG | SYSTOLIC BLOOD PRESSURE: 100 MMHG

## 2017-11-29 DIAGNOSIS — I10 ESSENTIAL HYPERTENSION: Primary | ICD-10-CM

## 2017-11-29 DIAGNOSIS — N18.9 CHRONIC RENAL IMPAIRMENT, UNSPECIFIED CKD STAGE: ICD-10-CM

## 2017-11-29 DIAGNOSIS — R60.0 BILATERAL LEG EDEMA: ICD-10-CM

## 2017-11-29 NOTE — PROGRESS NOTES
Chief Complaint   Patient presents with    Leg Swelling         HPI:       is a 80 y.o. female retired  who lives alone. Remote history of treatment for a B cell lymphoma (chemo). There is longstanding CRI, compensated CHF, HTN, pacemaker implantation and shingles. For the past several months she has required daily diuretic therapy to manage her CHF and leg edema, which, at times, was massive and oozing. This is no longer the case. Complains of fatigue. New Issues:  Diarrhea last week. Still feeling tired. Continued diuretic throughout the course of her illness. No Known Allergies    Current Outpatient Prescriptions   Medication Sig    naproxen sodium (ALEVE) 220 mg cap Take  by mouth daily as needed.  triamcinolone acetonide (KENALOG) 0.1 % topical cream Apply  to affected area two (2) times daily as needed for Skin Irritation.  magnesium hydroxide (MILK OF MAGNESIA) 400 mg/5 mL suspension Take 30 mL by mouth daily as needed for Constipation.  OTHER,NON-FORMULARY, Indications: Moisture cream with colloidal oatmeal    bumetanide (BUMEX) 1 mg tablet TAKE 2 TABLETS BY MOUTH DAILY FOR SWELLING    prednisoLONE acetate (PRED FORTE) 1 % ophthalmic suspension Administer 1 Drop to left eye four (4) times daily.  pilocarpine (PILOCAR) 4 % ophthalmic solution Administer 1 Drop to left eye three (3) times daily.  dorzolamide-timolol (COSOPT) 2-0.5 % ophthalmic solution Administer 1 Drop to both eyes two (2) times a day.  bimatoprost (LUMIGAN) 0.03 % ophthalmic drops Administer 1 Drop to left eye nightly. No current facility-administered medications for this visit.         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     Bronchitis     Carotid disease, bilateral ([address]     Cholelithiasis 2009    Clostridium difficile colitis July 17, 2016    [facility]    Constipation     CRI (chronic renal insufficiency)     Dyspepsia and other specified disorders of function of stomach     Frequent urination     Glaucoma     HCVD (hypertensive cardiovascular disease)     Hemorrhoids     History of seasonal allergies     Hypercholesterolemia          ROS:  Denies fever, chills, cough, chest pain, SOB,  nausea, vomiting, or diarrhea. Denies wt loss, wt gain, hemoptysis, hematochezia or melena. Physical Examination:    /70 (BP 1 Location: Left arm, BP Patient Position: Sitting)  Pulse 80  Resp 15  Ht 5' 5\" (1.651 m)  Wt 124 lb 12.8 oz (56.6 kg)  SpO2 97%  BMI 20.77 kg/m2    General: Alert and Ox3, Fluent speech  HEENT:  NC/AT, EOMI, OP: clear  Neck:  Supple, no adenopathy, JVD, mass or bruit  Chest:  Clear to Ausculation, without wheezes, rales, rubs or ronchi  Cardiac: RRR with a 2/6 Diastolic murmur. Abdomen:  +BS, soft, nontender without palpable HSM  Extremities:  1(+) BLE edema  Neurologic:  Ambulatory without assist, CN 2-12 grossly intact. Moves all extremities. Skin: no rash  Lymphadenopathy: no cervical or supraclavicular nodes      ASSESSMENT AND PLAN:     1. BLE edema:  Continue with current diuretic plan. Working well and is based on her daily weight. Karen Wakefield is doing an excellent job helping her. 2.  Fatigue: May be hypokalemic:  Checking labs.     Orders Placed This Encounter    CBC WITH AUTOMATED DIFF    METABOLIC PANEL, COMPREHENSIVE    TSH 3RD GENERATION       Kristine Booth MD, [place]

## 2017-11-29 NOTE — PATIENT INSTRUCTIONS
If you have any questions regarding [employer], you may call [employer] support at [phone].

## 2017-11-29 NOTE — MR AVS SNAPSHOT
Visit Information Date & Time Provider Department Dept. Phone Encounter #  
 11/29/2017  3:00 PM Joaquina Wright MD [address] [place] [address] [id number] Follow-up Instructions Return in about 2 months (around 1/29/2018). Follow-up and Disposition History Your Appointments 2/2/2018 10:30 AM  
IMMUNIZATIONS/INJECTIONS with MD Yehuda [address] MED [facility]) Appt Note: 2 MO F/U  
 1000 [facility] [address] [facility],5Th [address] [address] [phone]  
  
   
 1000 [facility] [address] [facility],5[address] [address]  
  
    
 6/22/2018 11:00 AM  
ESTABLISHED PATIENT with Amita Reddy MD  
[address] [facility] Appt Note: medtronic pacemaker check and 6 mo follow up $0cp [address] [address] [phone]  
  
   
 [address] [facility] [address] [address] 2/19/2018  8:00 AM  
REMOTE OFFICE VISIT with [facility] Appt Note: NOT AN OFFICE VISIT - REMOTE MDT PM  
 [address] [facility]  
[phone] [address] [facility] Upcoming Health Maintenance Date Due  
 MEDICARE YEARLY EXAM 4/26/2018 GLAUCOMA SCREENING Q2Y 3/31/2019 DTaP/Tdap/Td series (2 - Td) 4/25/2027 Allergies as of 11/29/2017  Review Complete On: 11/29/2017 By: Joaquina Wright MD  
 No Known Allergies Current Immunizations  Reviewed on 7/12/2017 Name Date Influenza High Dose Vaccine PF 9/19/2017  3:25 PM, 11/22/2016  4:35 PM  
 Influenza Vaccine 10/7/2015 Pneumococcal Conjugate (PCV-13) 4/25/2017  4:01 PM  
 Pneumococcal Polysaccharide (PPSV-23) 9/15/2009 Td 8/31/2009 Not reviewed this visit You Were Diagnosed With   
  
 Codes Comments Essential hypertension    -  Primary ICD-10-CM: I10 
ICD-9-CM: 401.9 Bilateral leg edema     ICD-10-CM: R60.0 ICD-9-CM: 798. 3 Chronic renal impairment, unspecified CKD stage     ICD-10-CM: N18.9 ICD-9-CM: 568. 9 Vitals BP Pulse Resp Height(growth percentile) Weight(growth percentile) SpO2  
 100/70 (BP 1 Location: Left arm, BP Patient Position: Sitting) 80 15 5' 5\" (1.651 m) 124 lb 12.8 oz (56.6 kg) 97% BMI Smoking Status 20.77 kg/m2 Never Smoker BMI and BSA Data Body Mass Index Body Surface Area 20.77 kg/m 2 1.61 m 2 Preferred Pharmacy Pharmacy Name [facility] [phone] Your Updated Medication List  
  
   
This list is accurate as of: 11/29/17  3:48 PM.  Always use your most recent med list.  
  
  
  
  
 ALEVE 220 mg Cap Generic drug:  naproxen sodium Take  by mouth daily as needed. bumetanide 1 mg tablet Commonly known as:  Jackie Pemberton TAKE 2 TABLETS BY MOUTH DAILY FOR SWELLING  
  
 COSOPT 22.3-6.8 mg/mL ophthalmic solution Generic drug:  dorzolamide-timolol Administer 1 Drop to both eyes two (2) times a day. LUMIGAN 0.03 % ophthalmic drops Generic drug:  bimatoprost  
Administer 1 Drop to left eye nightly.  
  
 magnesium hydroxide 400 mg/5 mL suspension Commonly known as:  MILK OF MAGNESIA Take 30 mL by mouth daily as needed for Constipation. OTHER(NON-FORMULARY) Indications: Moisture cream with colloidal oatmeal  
  
 pilocarpine 4 % ophthalmic solution Commonly known as:  PILOCAR Administer 1 Drop to left eye three (3) times daily. prednisoLONE acetate 1 % ophthalmic suspension Commonly known as:  PRED FORTE Administer 1 Drop to left eye four (4) times daily. triamcinolone acetonide 0.1 % topical cream  
Commonly known as:  KENALOG Apply  to affected area two (2) times daily as needed for Skin Irritation. We Performed the Following CBC WITH AUTOMATED DIFF [12496 CPT(R)] METABOLIC PANEL, COMPREHENSIVE [95930 CPT(R)] TSH 3RD GENERATION [65054 CPT(R)] Follow-up Instructions Return in about 2 months (around 1/29/2018). Patient Instructions If you have any questions regarding [employer], you may call Lizhi support at [phone]. Introducing [facility] & HEALTH SERVICES! Keith Gonzalez introduces Glasses[employer] patient portal. Now you can access parts of your medical record, email your doctor's office, and request medication refills online. 1. In your internet browser, go to [email or web address] 2. Click on the First Time User? Click Here link in the Sign In box. You will see the New Member Sign Up page. 3. Enter your [employer] Access Code exactly as it appears below. You will not need to use this code after youve completed the sign-up process. If you do not sign up before the expiration date, you must request a new code. · [employer] Access Code: [email or web address] Expires: 2/15/2018 11:31 AM 
 
4. Enter the last four digits of your Social Security Number (xxxx) and Date of Birth (mm/dd/yyyy) as indicated and click Submit. You will be taken to the next sign-up page. 5. Create a Glasses[employer] ID. This will be your Glass[employer] login ID and cannot be changed, so think of one that is secure and easy to remember. 6. Create a [employer] password. You can change your password at any time. 7. Enter your Password Reset Question and Answer. This can be used at a later time if you forget your password. 8. Enter your e-mail address. You will receive e-mail notification when new information is available in [address] 9. Click Sign Up. You can now view and download portions of your medical record. 10. Click the Download Summary menu link to download a portable copy of your medical information. If you have questions, please visit the Frequently Asked Questions section of the [employer] website.  Remember, [employer] is NOT to be used for urgent needs. For medical emergencies, dial 911. Now available from your iPhone and Android! Please provide this summary of care documentation to your next provider. Your primary care clinician is listed as Meera Gardner. If you have any questions after today's visit, please call [phone].

## 2017-11-30 LAB
ALBUMIN SERPL-MCNC: 4.1 G/DL (ref 3.2–4.6)
ALBUMIN/GLOB SERPL: 1.3 {RATIO} (ref 1.2–2.2)
ALP SERPL-CCNC: 72 IU/L (ref 39–117)
ALT SERPL-CCNC: 11 IU/L (ref 0–32)
AST SERPL-CCNC: 19 IU/L (ref 0–40)
BASOPHILS # BLD AUTO: 0 X10E3/UL (ref 0–0.2)
BASOPHILS NFR BLD AUTO: 0 %
BILIRUB SERPL-MCNC: 0.5 MG/DL (ref 0–1.2)
BUN SERPL-MCNC: 46 MG/DL (ref 10–36)
BUN/CREAT SERPL: 30 (ref 12–28)
CALCIUM SERPL-MCNC: 9.5 MG/DL (ref 8.7–10.3)
CHLORIDE SERPL-SCNC: 100 MMOL/L (ref 96–106)
CO2 SERPL-SCNC: 29 MMOL/L (ref 18–29)
CREAT SERPL-MCNC: 1.55 MG/DL (ref 0.57–1)
EOSINOPHIL # BLD AUTO: 0.1 X10E3/UL (ref 0–0.4)
EOSINOPHIL NFR BLD AUTO: 2 %
ERYTHROCYTE [DISTWIDTH] IN BLOOD BY AUTOMATED COUNT: 14.8 % (ref 12.3–15.4)
GFR SERPLBLD CREATININE-BSD FMLA CKD-EPI: 28 ML/MIN/1.73
GFR SERPLBLD CREATININE-BSD FMLA CKD-EPI: 32 ML/MIN/1.73
GLOBULIN SER CALC-MCNC: 3.1 G/DL (ref 1.5–4.5)
GLUCOSE SERPL-MCNC: 77 MG/DL (ref 65–99)
HCT VFR BLD AUTO: 38.4 % (ref 34–46.6)
HGB BLD-MCNC: 12.6 G/DL (ref 11.1–15.9)
IMM GRANULOCYTES # BLD: 0 X10E3/UL (ref 0–0.1)
IMM GRANULOCYTES NFR BLD: 0 %
LYMPHOCYTES # BLD AUTO: 1.9 X10E3/UL (ref 0.7–3.1)
LYMPHOCYTES NFR BLD AUTO: 33 %
MCH RBC QN AUTO: 30.2 PG (ref 26.6–33)
MCHC RBC AUTO-ENTMCNC: 32.8 G/DL (ref 31.5–35.7)
MCV RBC AUTO: 92 FL (ref 79–97)
MONOCYTES # BLD AUTO: 0.5 X10E3/UL (ref 0.1–0.9)
MONOCYTES NFR BLD AUTO: 8 %
NEUTROPHILS # BLD AUTO: 3.3 X10E3/UL (ref 1.4–7)
NEUTROPHILS NFR BLD AUTO: 57 %
PLATELET # BLD AUTO: 195 X10E3/UL (ref 150–379)
POTASSIUM SERPL-SCNC: 4.9 MMOL/L (ref 3.5–5.2)
PROT SERPL-MCNC: 7.2 G/DL (ref 6–8.5)
RBC # BLD AUTO: 4.17 X10E6/UL (ref 3.77–5.28)
SODIUM SERPL-SCNC: 144 MMOL/L (ref 134–144)
TSH SERPL DL<=0.005 MIU/L-ACNC: 4.3 UIU/ML (ref 0.45–4.5)
WBC # BLD AUTO: 5.7 X10E3/UL (ref 3.4–10.8)

## 2018-02-02 ENCOUNTER — OFFICE VISIT (OUTPATIENT)
Dept: FAMILY MEDICINE CLINIC | Age: 83
End: 2018-02-02

## 2018-02-02 VITALS
SYSTOLIC BLOOD PRESSURE: 140 MMHG | HEART RATE: 82 BPM | DIASTOLIC BLOOD PRESSURE: 78 MMHG | OXYGEN SATURATION: 98 % | WEIGHT: 127 LBS | BODY MASS INDEX: 21.16 KG/M2 | RESPIRATION RATE: 16 BRPM | HEIGHT: 65 IN

## 2018-02-02 DIAGNOSIS — R60.0 BILATERAL LEG EDEMA: Primary | ICD-10-CM

## 2018-02-02 DIAGNOSIS — I35.1 AORTIC VALVE INSUFFICIENCY, ETIOLOGY OF CARDIAC VALVE DISEASE UNSPECIFIED: ICD-10-CM

## 2018-02-02 RX ORDER — LACTULOSE 10 G/15ML
SOLUTION ORAL; RECTAL 3 TIMES DAILY
COMMUNITY
End: 2018-06-22 | Stop reason: ALTCHOICE

## 2018-02-02 NOTE — MR AVS SNAPSHOT
[address] [facility] [address] 
 
 
 [address] [address] [facility],5Th Floor [address] [phone] Patient: Olinda Tubbs MRN: [id number] [facility]:[birth year] Visit Information Date & Time Provider Department Dept. Phone Encounter #  
 2/2/2018 10:30 AM Acacia Blas MD [facility] [address] [id number] Follow-up Instructions Return in about 2 months (around 4/2/2018). Follow-up and Disposition History Your Appointments 4/5/2018  3:00 PM  
ESTABLISHED PATIENT with Acacia Blas MD  
[address] [facility] Appt Note: 2 mo f/u  
 1000 [address] [address] [phone]  
  
   
 [address] [address]  
  
    
 6/22/2018 11:00 AM  
ESTABLISHED PATIENT with Tejas Sarabia MD  
[address] [facility] [place] [facility]) Appt Note: medtronic pacemaker check and 6 mo follow up $0cp [address] [address] [phone]  
  
   
 [address] [place] [address] [address] [address] 2/19/2018  8:00 AM  
REMOTE OFFICE VISIT with [facility]) Appt Note: NOT AN OFFICE VISIT - REMOTE MDT PM  
 [id number] [facility]  
[phone] [address] [facility] Upcoming Health Maintenance Date Due  
 MEDICARE YEARLY EXAM 4/26/2018 GLAUCOMA SCREENING Q2Y 3/31/2019 DTaP/Tdap/Td series (2 - Td) 4/25/2027 Allergies as of 2/2/2018  Review Complete On: 2/2/2018 By: Acacia Blas MD  
 No Known Allergies Current Immunizations  Reviewed on 7/12/2017 Name Date Influenza High Dose Vaccine PF 9/19/2017  3:25 PM, 11/22/2016  4:35 PM  
 Influenza Vaccine 10/7/2015 Pneumococcal Conjugate (PCV-13) 4/25/2017  4:01 PM  
 Pneumococcal Polysaccharide (PPSV-23) 9/15/2009 Td 8/31/2009 Not reviewed this visit You Were Diagnosed With   
  
 Codes Comments Bilateral leg edema    -  Primary ICD-10-CM: R60.0 ICD-9-CM: 111. 3 Aortic valve insufficiency, etiology of cardiac valve disease unspecified     ICD-10-CM: I35.1 ICD-9-CM: 424.1 Vitals BP Pulse Resp Height(growth percentile) Weight(growth percentile) SpO2  
 140/78 (BP 1 Location: Left arm, BP Patient Position: Sitting) 82 16 5' 4.5\" (1.638 m) 127 lb (57.6 kg) 98% BMI Smoking Status 21.46 kg/m2 Never Smoker BMI and BSA Data Body Mass Index Body Surface Area  
 21.46 kg/m 2 1.62 m 2 Preferred Pharmacy Pharmacy Name Phone [facility] 79 [phone] Your Updated Medication List  
  
   
This list is accurate as of: 2/2/18 11:42 AM.  Always use your most recent med list.  
  
  
  
  
 ALEVE 220 mg Cap Generic drug:  naproxen sodium Take  by mouth daily as needed. bumetanide 1 mg tablet Commonly known as:  Sandy Rain TAKE 2 TABLETS BY MOUTH DAILY FOR SWELLING  
  
 COSOPT 22.3-6.8 mg/mL ophthalmic solution Generic drug:  dorzolamide-timolol Administer 1 Drop to both eyes two (2) times a day. lactulose 10 gram/15 mL solution Commonly known as:  Dorann Deadwood Take  by mouth three (3) times daily. LUMIGAN 0.03 % ophthalmic drops Generic drug:  bimatoprost  
Administer 1 Drop to left eye nightly.  
  
 magnesium hydroxide 400 mg/5 mL suspension Commonly known as:  MILK OF MAGNESIA Take 30 mL by mouth daily as needed for Constipation. OTHER(NON-FORMULARY) Indications: Moisture cream with colloidal oatmeal  
  
 triamcinolone acetonide 0.1 % topical cream  
Commonly known as:  KENALOG Apply  to affected area two (2) times daily as needed for Skin Irritation. Follow-up Instructions Return in about 2 months (around 4/2/2018). Patient Instructions If you have any questions regarding [employer], you may call [employer] support at [phone]. Introducing [facility] & HEALTH SERVICES! Rachid Heaton introduces eSNF patient portal. Now you can access parts of your medical record, email your doctor's office, and request medication refills online. 1. In your internet browser, go to [email or web address] 2. Click on the First Time User? Click Here link in the Sign In box. You will see the New Member Sign Up page. 3. Enter your eSNF Access Code exactly as it appears below. You will not need to use this code after youve completed the sign-up process. If you do not sign up before the expiration date, you must request a new code. · eSNF Access Code: [email or web address] Expires: 2/15/2018 11:31 AM 
 
4. Enter the last four digits of your Social Security Number (xxxx) and Date of Birth (mm/dd/yyyy) as indicated and click Submit. You will be taken to the next sign-up page. 5. Create a eSNF ID. This will be your eSNF login ID and cannot be changed, so think of one that is secure and easy to remember. 6. Create a eSNF password. You can change your password at any time. 7. Enter your Password Reset Question and Answer. This can be used at a later time if you forget your password. 8. Enter your e-mail address. You will receive e-mail notification when new information is available in [address] 9. Click Sign Up. You can now view and download portions of your medical record. 10. Click the Download Summary menu link to download a portable copy of your medical information. If you have questions, please visit the Frequently Asked Questions section of the eSNF website. Remember, eSNF is NOT to be used for urgent needs. For medical emergencies, dial 911. Now available from your iPhone and Android! Please provide this summary of care documentation to your next provider. Your primary care clinician is listed as Denilson Pina. If you have any questions after today's visit, please call [phone].

## 2018-02-02 NOTE — PATIENT INSTRUCTIONS
If you have any questions regarding [employer], you may call [employer] support at [phone].

## 2018-02-02 NOTE — PROGRESS NOTES
Chief Complaint   Patient presents with    Leg Swelling         HPI:       is a 80 y.o. female retired  who lives alone. Remote history of treatment for a B cell lymphoma (chemo). There is longstanding CRI, compensated CHF, HTN, pacemaker implantation and shingles. For the past several months she has required daily diuretic therapy to manage her CHF and leg edema, which, at times, was massive and oozing. This is no longer the case. Complains of fatigue. No Known Allergies    Current Outpatient Prescriptions   Medication Sig    naproxen sodium (ALEVE) 220 mg cap Take  by mouth daily as needed.  triamcinolone acetonide (KENALOG) 0.1 % topical cream Apply  to affected area two (2) times daily as needed for Skin Irritation.  magnesium hydroxide (MILK OF MAGNESIA) 400 mg/5 mL suspension Take 30 mL by mouth daily as needed for Constipation.  OTHER,NON-FORMULARY, Indications: Moisture cream with colloidal oatmeal    bumetanide (BUMEX) 1 mg tablet TAKE 2 TABLETS BY MOUTH DAILY FOR SWELLING    prednisoLONE acetate (PRED FORTE) 1 % ophthalmic suspension Administer 1 Drop to left eye four (4) times daily.  pilocarpine (PILOCAR) 4 % ophthalmic solution Administer 1 Drop to left eye three (3) times daily.  dorzolamide-timolol (COSOPT) 2-0.5 % ophthalmic solution Administer 1 Drop to both eyes two (2) times a day.  bimatoprost (LUMIGAN) 0.03 % ophthalmic drops Administer 1 Drop to left eye nightly. No current facility-administered medications for this visit.         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 Denies wt loss, wt gain, hemoptysis, hematochezia or melena. Physical Examination:    /78 (BP 1 Location: Left arm, BP Patient Position: Sitting)  Pulse 82  Resp 16  Ht 5' 4.5\" (1.638 m)  Wt 127 lb (57.6 kg)  SpO2 98%  BMI 21.46 kg/m2    General: Alert and Ox3, Fluent speech  HEENT:  NC/AT, EOMI, OP: clear  Neck:  Supple, no adenopathy, JVD, mass or bruit  Chest:  Clear to Ausculation, without wheezes, rales, rubs or ronchi  Cardiac: RRR with a 2/6 Diastolic murmur. Abdomen:  +BS, soft, nontender without palpable HSM  Extremities:  1(+) BLE edema  Neurologic:  Ambulatory without assist, CN 2-12 grossly intact. Moves all extremities. Skin: no rash  Lymphadenopathy: no cervical or supraclavicular nodes      ASSESSMENT AND PLAN:     1. BLE edema:  Continue with current diuretic plan. Working well and is based on her daily weight. Den Sultana is doing an excellent job helping her.   2.  Stable AI    Orders Placed This Encounter    CBC WITH AUTOMATED DIFF    METABOLIC PANEL, COMPREHENSIVE    TSH 3RD GENERATION       Mohsen Whittaker MD, [place]

## 2018-02-08 RX ORDER — POLYETHYLENE GLYCOL 3350 17 G/17G
POWDER, FOR SOLUTION ORAL
Qty: 527 G | Refills: 1 | Status: SHIPPED | OUTPATIENT
Start: 2018-02-08 | End: 2018-02-10 | Stop reason: SDUPTHER

## 2018-02-10 RX ORDER — POLYETHYLENE GLYCOL 3350 17 G/17G
POWDER, FOR SOLUTION ORAL
Qty: 527 G | Refills: 5 | Status: SHIPPED | OUTPATIENT
Start: 2018-02-10 | End: 2018-12-13 | Stop reason: SDUPTHER

## 2018-02-20 ENCOUNTER — CLINICAL SUPPORT (OUTPATIENT)
Dept: CARDIOLOGY CLINIC | Age: 83
End: 2018-02-20

## 2018-02-20 DIAGNOSIS — Z95.0 PACEMAKER: Primary | ICD-10-CM

## 2018-02-20 DIAGNOSIS — I44.2 ATRIOVENTRICULAR BLOCK, COMPLETE (HCC): ICD-10-CM

## 2018-04-05 ENCOUNTER — OFFICE VISIT (OUTPATIENT)
Dept: FAMILY MEDICINE CLINIC | Age: 83
End: 2018-04-05

## 2018-04-05 VITALS
DIASTOLIC BLOOD PRESSURE: 70 MMHG | WEIGHT: 126 LBS | HEART RATE: 70 BPM | BODY MASS INDEX: 21.51 KG/M2 | OXYGEN SATURATION: 98 % | HEIGHT: 64 IN | RESPIRATION RATE: 18 BRPM | SYSTOLIC BLOOD PRESSURE: 136 MMHG

## 2018-04-05 DIAGNOSIS — I10 ESSENTIAL HYPERTENSION: ICD-10-CM

## 2018-04-05 DIAGNOSIS — R60.0 BILATERAL LEG EDEMA: Primary | ICD-10-CM

## 2018-04-05 DIAGNOSIS — Z00.00 MEDICARE ANNUAL WELLNESS VISIT, SUBSEQUENT: ICD-10-CM

## 2018-04-05 RX ORDER — PREDNISOLONE ACETATE 10 MG/ML
1 SUSPENSION/ DROPS OPHTHALMIC 4 TIMES DAILY
COMMUNITY
End: 2018-05-10

## 2018-04-05 NOTE — MR AVS SNAPSHOT
[facility] 
 
 
 1000 [facility] [address] [facility],5[address] Floor [address] [phone] Patient: Noam Villatoro MRN: [id number] [facility]:9/[birth year]70 Visit Information Date & Time Provider Department Dept. Phone Encounter #  
 4/5/2018  3:00 PM Shabana Jara MD [address] [id number] Your Appointments 6/22/2018 11:00 AM  
ESTABLISHED PATIENT with Duong Stanford MD  
[address] [facility] [place] [facility] Appt Note: medtronic pacemaker check and 6 mo follow up $0cp [address] [address] [phone]  
  
   
 [address] [address] 7/12/2018  3:00 PM  
ESTABLISHED PATIENT with Shabana Jara MD  
[address] ([facility]) Appt Note: 3mo fu  
 [address] [address] [phone]  
  
   
 [address] [address] Upcoming Health Maintenance Date Due  
 MEDICARE YEARLY EXAM 4/26/2018 GLAUCOMA SCREENING Q2Y 3/31/2019 DTaP/Tdap/Td series (2 - Td) 4/25/2027 Allergies as of 4/5/2018  Review Complete On: 4/5/2018 By: Steve Gilmore RN No Known Allergies Current Immunizations  Reviewed on 7/12/2017 Name Date Influenza High Dose Vaccine PF 9/19/2017  3:25 PM, 11/22/2016  4:35 PM  
 Influenza Vaccine 10/7/2015 Pneumococcal Conjugate (PCV-13) 4/25/2017  4:01 PM  
 Pneumococcal Polysaccharide (PPSV-23) 9/15/2009 Td 8/31/2009 Not reviewed this visit You Were Diagnosed With   
  
 Codes Comments Essential hypertension    -  Primary ICD-10-CM: I10 
ICD-9-CM: 401.9 Vitals BP Pulse Resp Height(growth percentile) Weight(growth percentile) SpO2  
 136/70 (BP 1 Location: Left arm, BP Patient Position: Sitting) 70 18 5' 4\" (1.626 m) 126 lb (57.2 kg) 98% BMI Smoking Status 21.63 kg/m2 Never Smoker BMI and BSA Data Body Mass Index Body Surface Area  
 21.63 kg/m 2 1.61 m 2 Preferred Pharmacy Pharmacy Name Phone [facility] [phone] Your Updated Medication List  
  
   
This list is accurate as of 4/5/18  3:34 PM.  Always use your most recent med list.  
  
  
  
  
 ALEVE 220 mg Cap Generic drug:  naproxen sodium Take  by mouth daily as needed. bumetanide 1 mg tablet Commonly known as:  Matt Hurry TAKE 2 TABLETS BY MOUTH DAILY FOR SWELLING  
  
 COSOPT 22.3-6.8 mg/mL ophthalmic solution Generic drug:  dorzolamide-timolol Administer 1 Drop to both eyes two (2) times a day. lactulose 10 gram/15 mL solution Commonly known as:  Adron Query Take  by mouth three (3) times daily. LUMIGAN 0.03 % ophthalmic drops Generic drug:  bimatoprost  
Administer 1 Drop to left eye nightly.  
  
 magnesium hydroxide 400 mg/5 mL suspension Commonly known as:  MILK OF MAGNESIA Take 30 mL by mouth daily as needed for Constipation. OTHER(NON-FORMULARY) Indications: Moisture cream with colloidal oatmeal  
  
 polyethylene glycol 17 gram/dose powder Commonly known as:  Carlos Manuel Barrs MIX 1 CAPFUL (17GM) WITH 4 TO 8 OZ AND DRINK BY MOUTH TWICE DAILY Indications: Constipation  
  
 prednisoLONE acetate 1 % ophthalmic suspension Commonly known as:  PRED FORTE Administer 1 Drop to both eyes four (4) times daily. triamcinolone acetonide 0.1 % topical cream  
Commonly known as:  KENALOG Apply  to affected area two (2) times daily as needed for Skin Irritation. We Performed the Following METABOLIC PANEL, COMPREHENSIVE [55402 CPT(R)] Introducing [facility] & HEALTH SERVICES! [facility] introduces [employer] patient portal. Now you can access parts of your medical record, email your doctor's office, and request medication refills online. 1. In your internet browser, go to [email or web address] 2. Click on the First Time User? Click Here link in the Sign In box. You will see the New Member Sign Up page. 3. Enter your ThreatStream Access Code exactly as it appears below. You will not need to use this code after youve completed the sign-up process. If you do not sign up before the expiration date, you must request a new code. · ThreatStream Access Code: [email or web address] Expires: 6/27/2018  3:31 PM 
 
4. Enter the last four digits of your Social Security Number (xxxx) and Date of Birth (mm/dd/yyyy) as indicated and click Submit. You will be taken to the next sign-up page. 5. Create a ThreatStream ID. This will be your ThreatStream login ID and cannot be changed, so think of one that is secure and easy to remember. 6. Create a ThreatStream password. You can change your password at any time. 7. Enter your Password Reset Question and Answer. This can be used at a later time if you forget your password. 8. Enter your e-mail address. You will receive e-mail notification when new information is available in [address] 9. Click Sign Up. You can now view and download portions of your medical record. 10. Click the Download Summary menu link to download a portable copy of your medical information. If you have questions, please visit the Frequently Asked Questions section of the ThreatStream website. Remember, ThreatStream is NOT to be used for urgent needs. For medical emergencies, dial 911. Now available from your iPhone and Android! Please provide this summary of care documentation to your next provider. Your primary care clinician is listed as Marguerite Murcia. If you have any questions after today's visit, please call [phone].

## 2018-04-05 NOTE — PROGRESS NOTES
1. Have you been to the ER, urgent care clinic since your last visit? Hospitalized since your last visit? No    2. Have you seen or consulted any other health care providers outside of the [facility] since your last visit? Include any pap smears or colon screening.  No

## 2018-04-06 LAB
ALBUMIN SERPL-MCNC: 3.9 G/DL (ref 3.2–4.6)
ALBUMIN/GLOB SERPL: 1.2 {RATIO} (ref 1.2–2.2)
ALP SERPL-CCNC: 79 IU/L (ref 39–117)
ALT SERPL-CCNC: 9 IU/L (ref 0–32)
AST SERPL-CCNC: 22 IU/L (ref 0–40)
BILIRUB SERPL-MCNC: 0.5 MG/DL (ref 0–1.2)
BUN SERPL-MCNC: 35 MG/DL (ref 10–36)
BUN/CREAT SERPL: 22 (ref 12–28)
CALCIUM SERPL-MCNC: 9.5 MG/DL (ref 8.7–10.3)
CHLORIDE SERPL-SCNC: 101 MMOL/L (ref 96–106)
CO2 SERPL-SCNC: 27 MMOL/L (ref 18–29)
CREAT SERPL-MCNC: 1.59 MG/DL (ref 0.57–1)
GFR SERPLBLD CREATININE-BSD FMLA CKD-EPI: 27 ML/MIN/1.73
GFR SERPLBLD CREATININE-BSD FMLA CKD-EPI: 31 ML/MIN/1.73
GLOBULIN SER CALC-MCNC: 3.2 G/DL (ref 1.5–4.5)
GLUCOSE SERPL-MCNC: 107 MG/DL (ref 65–99)
POTASSIUM SERPL-SCNC: 4.3 MMOL/L (ref 3.5–5.2)
PROT SERPL-MCNC: 7.1 G/DL (ref 6–8.5)
SODIUM SERPL-SCNC: 142 MMOL/L (ref 134–144)

## 2018-04-06 NOTE — PROGRESS NOTES
Chief Complaint   Patient presents with    Vision Change         HPI:       is a 80 y.o. female retired  who lives alone. Remote history of treatment for a B cell lymphoma (chemo). There is longstanding CRI, compensated CHF, HTN, pacemaker implantation and shingles. For the past several months she has required daily diuretic therapy to manage her CHF and leg edema, which, at times, was massive and oozing. This is no longer the case. No Known Allergies    Current Outpatient Prescriptions   Medication Sig    prednisoLONE acetate (PRED FORTE) 1 % ophthalmic suspension Administer 1 Drop to both eyes four (4) times daily.  polyethylene glycol (MIRALAX) 17 gram/dose powder MIX 1 CAPFUL (17GM) WITH 4 TO 8 OZ AND DRINK BY MOUTH TWICE DAILY Indications: Constipation    lactulose (CHRONULAC) 10 gram/15 mL solution Take  by mouth three (3) times daily.  bumetanide (BUMEX) 1 mg tablet TAKE 2 TABLETS BY MOUTH DAILY FOR SWELLING    naproxen sodium (ALEVE) 220 mg cap Take  by mouth daily as needed.  triamcinolone acetonide (KENALOG) 0.1 % topical cream Apply  to affected area two (2) times daily as needed for Skin Irritation.  magnesium hydroxide (MILK OF MAGNESIA) 400 mg/5 mL suspension Take 30 mL by mouth daily as needed for Constipation.  OTHER,NON-FORMULARY, Indications: Moisture cream with colloidal oatmeal    dorzolamide-timolol (COSOPT) 2-0.5 % ophthalmic solution Administer 1 Drop to both eyes two (2) times a day.  bimatoprost (LUMIGAN) 0.03 % ophthalmic drops Administer 1 Drop to left eye nightly. No current facility-administered medications for this visit.         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    B-cell lymphoma ([address]     Bronchitis     Carotid disease, bilateral ([address]     Cholelithiasis 2009    Clostridium difficile colitis July 17, 2016    [facility] of [facility] [place] admission    Constipation     CRI (chronic renal insufficiency)     Dyspepsia and other specified disorders of function of stomach     Frequent urination     Glaucoma     HCVD (hypertensive cardiovascular disease)     Hemorrhoids     History of seasonal allergies     Hypercholesterolemia          ROS:  Denies fever, chills, cough, chest pain, SOB,  nausea, vomiting, or diarrhea. Denies wt loss, wt gain, hemoptysis, hematochezia or melena. Physical Examination:    /70 (BP 1 Location: Left arm, BP Patient Position: Sitting)  Pulse 70  Resp 18  Ht 5' 4\" (1.626 m)  Wt 126 lb (57.2 kg)  SpO2 98%  BMI 21.63 kg/m2    General: Alert and Ox3, Fluent speech  HEENT:  NC/AT, EOMI, OP: clear  Neck:  Supple, no adenopathy, JVD, mass or bruit  Chest:  Clear to Ausculation, without wheezes, rales, rubs or ronchi  Cardiac: RRR with a 3/6 THERON  Abdomen:  +BS, soft, nontender without palpable HSM  Extremities:  No cyanosis, clubbing or edema  Neurologic:  Ambulatory without assist, CN 2-12 grossly intact. Moves all extremities. Skin: no rash  Lymphadenopathy: no cervical or supraclavicular nodes      ASSESSMENT AND PLAN:     1. Leg edema:  Looks good. Continue with current diuretic protocol  2. HTN is well controlled  3. Due for SAWV  4. ACP is updated  5. She has aged out of most screenings    Orders Placed This Encounter    METABOLIC PANEL, COMPREHENSIVE    prednisoLONE acetate (PRED FORTE) 1 % ophthalmic suspension     Sig: Administer 1 Drop to both eyes four (4) times daily. Rashmi Goldman MD, FACP        ______________________________________________________________________    Mariya Hutchinson is a 80 y.o. female and presents for annual Medicare Wellness Visit. Problem List: Reviewed with patient and discussed risk factors.     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 Female Alcohol Screening: On any occasion during the past 3 months, have you had more than 3 drinks containing alcohol? No    Do you average more than 7 drinks per week? No    ROS:  Constitutional: No fever, chills or weight loss  Respiratory: No cough, SOB   CV: No chest pain or Palpitations    Objective:  Visit Vitals    /70 (BP 1 Location: Left arm, BP Patient Position: Sitting)    Pulse 70    Resp 18    Ht 5' 4\" (1.626 m)    Wt 126 lb (57.2 kg)    SpO2 98%    BMI 21.63 kg/m2    Body mass index is 21.63 kg/(m^2). Assessment of cognitive impairment: Alert and oriented x 3    Depression Screen:   PHQ over the last two weeks 7/12/2017   Little interest or pleasure in doing things Not at all   Feeling down, depressed or hopeless Not at all   Total Score PHQ 2 0       Fall Risk Assessment:    Fall Risk Assessment, last 12 mths 7/12/2017   Able to walk? Yes   Fall in past 12 months? Yes   Fall with injury? No   Number of falls in past 12 months 3   Fall Risk Score 3       Functional Ability:   Does the patient exhibit a steady gait? No-uses walker   How long did it take the patient to get up and walk from a sitting position?  30 sec   Is the patient self reliant?  (ie can do own laundry, meals, household chores)  no     Does the patient handle his/her own medications?  no     Does the patient handle his/her own money?   no     Is the patients home safe (ie good lighting, handrails on stairs and bath, etc.)? yes     Did you notice or did patient express any hearing difficulties? yes     Did you notice or did patient express any vision difficulties? yes       Advance Care Planning:   Patient was offered the opportunity to discuss advance care planning:  yes     Does patient have an Advance Directive:  yes   If no, did you provide information on Caring Connections?  no       Plan:      Orders Placed This Encounter    METABOLIC PANEL, COMPREHENSIVE    prednisoLONE acetate (PRED FORTE) 1 % ophthalmic suspension       Health Maintenance   Topic Date Due    MEDICARE YEARLY EXAM  04/26/2018    GLAUCOMA SCREENING Q2Y  03/31/2019    DTaP/Tdap/Td series (2 - Td) 04/25/2027    Bone Densitometry (Dexa) Screening  Addressed    ZOSTER VACCINE AGE 60>  Addressed    Pneumococcal 65+ Low/Medium Risk  Completed    Influenza Age 5 to Adult  Completed       *Patient verbalized understanding and agreement with the plan. A copy of the After Visit Summary with personalized health plan was given to the patient today.

## 2018-04-19 ENCOUNTER — OFFICE VISIT (OUTPATIENT)
Dept: FAMILY MEDICINE CLINIC | Age: 83
End: 2018-04-19

## 2018-04-19 VITALS
SYSTOLIC BLOOD PRESSURE: 150 MMHG | DIASTOLIC BLOOD PRESSURE: 100 MMHG | TEMPERATURE: 98.2 F | BODY MASS INDEX: 21.54 KG/M2 | WEIGHT: 126.2 LBS | HEIGHT: 64 IN | RESPIRATION RATE: 14 BRPM | HEART RATE: 77 BPM | OXYGEN SATURATION: 97 %

## 2018-04-19 DIAGNOSIS — K11.20 SUBMANDIBULAR GLAND INFECTION: Primary | ICD-10-CM

## 2018-04-19 RX ORDER — AMOXICILLIN 500 MG/1
500 CAPSULE ORAL 3 TIMES DAILY
Qty: 30 CAP | Refills: 0 | Status: SHIPPED | OUTPATIENT
Start: 2018-04-19 | End: 2018-04-29

## 2018-04-19 NOTE — PROGRESS NOTES
Chief Complaint   Patient presents with    Mass     mass on neck         HPI:       is a 80 y.o. female. Noted the presence of a nontender and painless swelling in the left lower jaw area. No recent dental work. Afebrile no chills. No Known Allergies    Current Outpatient Prescriptions   Medication Sig    amoxicillin (AMOXIL) 500 mg capsule Take 1 Cap by mouth three (3) times daily for 10 days.  prednisoLONE acetate (PRED FORTE) 1 % ophthalmic suspension Administer 1 Drop to both eyes four (4) times daily.  polyethylene glycol (MIRALAX) 17 gram/dose powder MIX 1 CAPFUL (17GM) WITH 4 TO 8 OZ AND DRINK BY MOUTH TWICE DAILY Indications: Constipation    lactulose (CHRONULAC) 10 gram/15 mL solution Take  by mouth three (3) times daily.  bumetanide (BUMEX) 1 mg tablet TAKE 2 TABLETS BY MOUTH DAILY FOR SWELLING    naproxen sodium (ALEVE) 220 mg cap Take  by mouth daily as needed.  triamcinolone acetonide (KENALOG) 0.1 % topical cream Apply  to affected area two (2) times daily as needed for Skin Irritation.  magnesium hydroxide (MILK OF MAGNESIA) 400 mg/5 mL suspension Take 30 mL by mouth daily as needed for Constipation.  OTHER,NON-FORMULARY, Indications: Moisture cream with colloidal oatmeal    dorzolamide-timolol (COSOPT) 2-0.5 % ophthalmic solution Administer 1 Drop to both eyes two (2) times a day.  bimatoprost (LUMIGAN) 0.03 % ophthalmic drops Administer 1 Drop to left eye nightly. No current facility-administered medications for this visit.         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 Denies wt loss, wt gain, hemoptysis, hematochezia or melena. Physical Examination:    BP (!) 150/100 (BP 1 Location: Left arm, BP Patient Position: Sitting)  Pulse 77  Temp 98.2 °F (36.8 °C) (Temporal)   Resp 14  Ht 5' 4\" (1.626 m)  Wt 126 lb 3.2 oz (57.2 kg)  SpO2 97%  BMI 21.66 kg/m2    General: Alert and Ox3, Fluent speech  HEENT:  NC/AT, EOMI, OP: clear  Neck:  nontender 1 cm nodule in the left submandibular area  Chest:  Clear to Ausculation, without wheezes, rales, rubs or ronchi  Cardiac: RRR with 2/6 THERON  Abdomen:  +BS, soft, nontender without palpable HSM  Extremities:  No cyanosis, clubbing or edema  Neurologic:  Ambulatory without assist, CN 2-12 grossly intact. Moves all extremities. Skin: no rash  Lymphadenopathy: no cervical or supraclavicular nodes      ASSESSMENT AND PLAN:     1. Left submandibular lesion:  Salivary gland vs adenopathy:  Trial of amoxicillin. RTC in 2 weeks. ENT referral.    Orders Placed This Encounter    amoxicillin (AMOXIL) 500 mg capsule     Sig: Take 1 Cap by mouth three (3) times daily for 10 days.      Dispense:  30 Cap     Refill:  0       Sanjay Butler MD, [address]

## 2018-04-19 NOTE — PROGRESS NOTES
1. Have you been to the ER, urgent care clinic since your last visit? Hospitalized since your last visit? No    2. Have you seen or consulted any other health care providers outside of the [address] [place] [address] since your last visit? Include any pap smears or colon screening.  Yes When: Dr. Emmett Elias [phone]

## 2018-04-19 NOTE — MR AVS SNAPSHOT
[address] [facility] 
 
 
 [address] [id number] [facility],5Th Floor [address] [phone] Patient: Mariya Hutchinson MRN: [id number] [facility]:[birth year] Visit Information Date & Time Provider Department Dept. Phone Encounter #  
 4/19/2018  3:00 PM MD Carlos Dover 38 [phone] [id number] Your Appointments 6/22/2018 11:00 AM  
ESTABLISHED PATIENT with Ajay Grant MD  
[address] [place] [facility] Appt Note: medtronic pacemaker check and 6 mo follow up $0cp [address] [address] [phone]  
  
   
 [address] [place] [id number] 7/12/2018  3:00 PM  
ESTABLISHED PATIENT with MD Carlos Dover 38 ([facility] Appt Note: 3mo fu  
 [address] [address] [phone]  
  
   
 [address] [address] Upcoming Health Maintenance Date Due  
 GLAUCOMA SCREENING Q2Y 3/31/2019 MEDICARE YEARLY EXAM 4/6/2019 DTaP/Tdap/Td series (2 - Td) 4/25/2027 Allergies as of 4/19/2018  Review Complete On: 4/19/2018 By: Livia Carter MD  
 No Known Allergies Current Immunizations  Reviewed on 7/12/2017 Name Date Influenza High Dose Vaccine PF 9/19/2017  3:25 PM, 11/22/2016  4:35 PM  
 Influenza Vaccine 10/7/2015 Pneumococcal Conjugate (PCV-13) 4/25/2017  4:01 PM  
 Pneumococcal Polysaccharide (PPSV-23) 9/15/2009 Td 8/31/2009 Not reviewed this visit Vitals BP Pulse Temp Resp Height(growth percentile) (!) 150/100 (BP 1 Location: Left arm, BP Patient Position: Sitting) 77 98.2 °F (36.8 °C) (Temporal) 14 5' 4\" (1.626 m) Weight(growth percentile) SpO2 BMI Smoking Status 126 lb 3.2 oz (57.2 kg) 97% 21.66 kg/m2 Never Smoker BMI and BSA Data Body Mass Index Body Surface Area  
 21.66 kg/m 2 1.61 m 2 Preferred Pharmacy Pharmacy Name Phone [facility] [phone] Your Updated Medication List  
  
   
This list is accurate as of 4/19/18  3:55 PM.  Always use your most recent med list.  
  
  
  
  
 ALEVE 220 mg Cap Generic drug:  naproxen sodium Take  by mouth daily as needed. bumetanide 1 mg tablet Commonly known as:  Nazia Carry TAKE 2 TABLETS BY MOUTH DAILY FOR SWELLING  
  
 COSOPT 22.3-6.8 mg/mL ophthalmic solution Generic drug:  dorzolamide-timolol Administer 1 Drop to both eyes two (2) times a day. lactulose 10 gram/15 mL solution Commonly known as:  Richrd Shadow Take  by mouth three (3) times daily. LUMIGAN 0.03 % ophthalmic drops Generic drug:  bimatoprost  
Administer 1 Drop to left eye nightly.  
  
 magnesium hydroxide 400 mg/5 mL suspension Commonly known as:  MILK OF MAGNESIA Take 30 mL by mouth daily as needed for Constipation. OTHER(NON-FORMULARY) Indications: Moisture cream with colloidal oatmeal  
  
 polyethylene glycol 17 gram/dose powder Commonly known as:  Marlane Blinks MIX 1 CAPFUL (17GM) WITH 4 TO 8 OZ AND DRINK BY MOUTH TWICE DAILY Indications: Constipation  
  
 prednisoLONE acetate 1 % ophthalmic suspension Commonly known as:  PRED FORTE Administer 1 Drop to both eyes four (4) times daily. triamcinolone acetonide 0.1 % topical cream  
Commonly known as:  KENALOG Apply  to affected area two (2) times daily as needed for Skin Irritation. Introducing [facility]! Radha Zacarias introduces [employer] patient portal. Now you can access parts of your medical record, email your doctor's office, and request medication refills online. 1. In your internet browser, go to [email or web address] 2. Click on the First Time User? Click Here link in the Sign In box. You will see the New Member Sign Up page. 3. Enter your [employer] Access Code exactly as it appears below.  You will not need to use this code after youve completed the sign-up process. If you do not sign up before the expiration date, you must request a new code. · [employer] Access Code: [email or web address] Expires: 6/27/2018  3:31 PM 
 
4. Enter the last four digits of your Social Security Number (xxxx) and Date of Birth (mm/dd/yyyy) as indicated and click Submit. You will be taken to the next sign-up page. 5. Create a [employer] ID. This will be your [employer] login ID and cannot be changed, so think of one that is secure and easy to remember. 6. Create a [employer] password. You can change your password at any time. 7. Enter your Password Reset Question and Answer. This can be used at a later time if you forget your password. 8. Enter your e-mail address. You will receive e-mail notification when new information is available in [address] 9. Click Sign Up. You can now view and download portions of your medical record. 10. Click the Download Summary menu link to download a portable copy of your medical information. If you have questions, please visit the Frequently Asked Questions section of the [employer] website. Remember, [employer] is NOT to be used for urgent needs. For medical emergencies, dial 911. Now available from your iPhone and Android! Please provide this summary of care documentation to your next provider. Your primary care clinician is listed as [facility]. If you have any questions after today's visit, please call [phone].

## 2018-05-13 RX ORDER — BUMETANIDE 1 MG/1
TABLET ORAL
Qty: 60 TAB | Refills: 2 | Status: SHIPPED | OUTPATIENT
Start: 2018-05-13 | End: 2018-06-22 | Stop reason: DRUGHIGH

## 2018-06-22 ENCOUNTER — OFFICE VISIT (OUTPATIENT)
Dept: CARDIOLOGY CLINIC | Age: 83
End: 2018-06-22

## 2018-06-22 ENCOUNTER — CLINICAL SUPPORT (OUTPATIENT)
Dept: CARDIOLOGY CLINIC | Age: 83
End: 2018-06-22

## 2018-06-22 VITALS
OXYGEN SATURATION: 97 % | RESPIRATION RATE: 16 BRPM | DIASTOLIC BLOOD PRESSURE: 80 MMHG | SYSTOLIC BLOOD PRESSURE: 146 MMHG | HEIGHT: 64 IN | WEIGHT: 123 LBS | HEART RATE: 77 BPM | BODY MASS INDEX: 21 KG/M2

## 2018-06-22 DIAGNOSIS — I44.2 ATRIOVENTRICULAR BLOCK, COMPLETE (HCC): ICD-10-CM

## 2018-06-22 DIAGNOSIS — I10 ESSENTIAL HYPERTENSION: ICD-10-CM

## 2018-06-22 DIAGNOSIS — I35.1 AORTIC VALVE INSUFFICIENCY, ETIOLOGY OF CARDIAC VALVE DISEASE UNSPECIFIED: ICD-10-CM

## 2018-06-22 DIAGNOSIS — Z95.0 PACEMAKER: ICD-10-CM

## 2018-06-22 DIAGNOSIS — Z95.0 PACEMAKER: Primary | ICD-10-CM

## 2018-06-22 DIAGNOSIS — I44.2 ATRIOVENTRICULAR BLOCK, COMPLETE (HCC): Primary | ICD-10-CM

## 2018-06-22 DIAGNOSIS — I50.32 DIASTOLIC CHF, CHRONIC (HCC): ICD-10-CM

## 2018-06-22 NOTE — PROGRESS NOTES
PATIENT ID VERIFIED WITH TWO PATIENT IDENTIFIERS. PATIENT MEDICATIONS REVIEWED AND APPROVED BY DR. Frida Irby. MEDICATIONS THAT WERE REMOVED FROM THIS VISIT HAVE BEEN APPROVED BY DR. Frida Irby. Niece will call with new eye drop prescribed today    Chief Complaint   Patient presents with    Pacemaker Check     Medtronic pacemaker check and 6 mo f/u    Hypertension    Valvular Heart Disease       1. Have you been to the ER, urgent care clinic since your last visit? Hospitalized since your last visit? Yes \[facility] [place] [facility]\"" ER May 2018 for chest pain and SOB    2. Have you seen or consulted any other health care providers outside of the [address] [place] [address] since your last visit?  Yes Dr. Ric Chapman -[facility]- seen today for a routine follow up

## 2018-06-22 NOTE — PROGRESS NOTES
Elvin Gómez is a 80 y.o. female is here for routine f/u and PPM check (see separate scanned). No CV sx or complaints. .  The patient denies chest pain/ shortness of breath, orthopnea, PND, LE edema, palpitations, syncope, presyncope or fatigue.        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    Atrioventricular block, complete ([address]     HCVD (hypertensive cardiovascular disease)     CRI (chronic renal insufficiency)     Aortic valve insufficiency     Carotid disease, bilateral (HCC)       Anastasiya Munoz MD  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    B-cell lymphoma ([address]     Bronchitis     Carotid disease, bilateral ([address]     Cholelithiasis 2009    Clostridium difficile colitis July 17, 2016    \[facility]\"" admission    Constipation     CRI (chronic renal insufficiency)     Dyspepsia and other specified disorders of function of stomach     Frequent urination     Glaucoma     HCVD (hypertensive cardiovascular disease)     Hemorrhoids     History of seasonal allergies     Hypercholesterolemia       Past Surgical History:   Procedure Laterality Date    HX HIP FRACTURE TX Left     HX LOBECTOMY Right 11/1998    middle    HX PACEMAKER  2003    DDD    HX PACEMAKER  5/2012    Gen Change     No Known Allergies   Family History   Problem Relation Age of Onset    Heart Attack Sister     Coronary Artery Disease Mother     Heart Attack Mother     Cancer Mother      lung    Heart Disease Mother      Renal failure    Hypertension Mother     Heart Attack Father       Social History     Social History    Marital status: SINGLE     Spouse name: N/A   [address] [place] [address] Number of children: N/A    Years of education: N/A     Occupational History    office work Retired     Social History Main Topics    Smoking status: Never Smoker    Smokeless tobacco: Never Used    Alcohol use No    Drug use: Not on file    Sexual activity: Not on file     Other Topics Concern     Service No    Blood Transfusions No    Caffeine Concern No    Occupational Exposure No    Hobby Hazards No    Sleep Concern No    Stress Concern No    Weight Concern No    Special Diet No    Back Care No    Exercise No     still lives alone and cares for home    Bike Helmet No    Seat Belt Yes    Self-Exams No     Social History Narrative    ** Merged History Encounter **           Current Outpatient Prescriptions   Medication Sig    bumetanide (BUMEX PO) Take 1 mg by mouth daily. Holds on days of appointments    polyethylene glycol (MIRALAX) 17 gram/dose powder MIX 1 CAPFUL (17GM) WITH 4 TO 8 OZ AND DRINK BY MOUTH TWICE DAILY Indications: Constipation (Patient taking differently: MIX 1 CAPFUL (17GM) WITH 4 TO 8 OZ AND DRINK BY MOUTH TWICE DAILY Indications: Constipation--CURRENTLY USING AS NEEDED)    naproxen sodium (ALEVE) 220 mg cap Take  by mouth daily as needed.  triamcinolone acetonide (KENALOG) 0.1 % topical cream Apply  to affected area two (2) times daily as needed for Skin Irritation.  magnesium hydroxide (MILK OF MAGNESIA) 400 mg/5 mL suspension Take 30 mL by mouth daily as needed for Constipation.  OTHER,NON-FORMULARY, Indications: Moisture cream with colloidal oatmeal    dorzolamide-timolol (COSOPT) 2-0.5 % ophthalmic solution Administer 1 Drop to both eyes two (2) times a day.  bimatoprost (LUMIGAN) 0.03 % ophthalmic drops Administer 1 Drop to left eye nightly. No current facility-administered medications for this visit. Review of Symptoms:    CONST  No weight change.  No fever, chills, sweats    ENT No visual changes, URI sx, sore throat    CV  See HPI RESP  No cough, or sputum, wheezing. Also see HPI   GI  No abdominal pain or change in bowel habits. No heartburn or dysphagia. No melena or rectal bleeding.   No dysuria, urgency, frequency, hematuria   MSKEL  No joint pain, swelling. No muscle pain. SKIN  No rash or lesions. NEURO  No headache, syncope, or seizure. No weakness, loss of sensation, or paresthesias. PSYCH  No low mood or depression  No anxiety. HE/LYMPH  No easy bruising, abnormal bleeding, or enlarged glands.         Physical ExamPhysical Exam:    Visit Vitals    /80 (BP 1 Location: Left arm, BP Patient Position: Sitting)    Pulse 77    Resp 16    Ht 5' 4\" (1.626 m)    Wt 123 lb (55.8 kg)    SpO2 97%    BMI 21.11 kg/m2     Gen: NAD  HEENT:  PERRL, throat clear  Neck: no adenopathy, no thyromegaly, no JVD   Heart:  Regular,Nl P6V2,  III/VI systolic/diastolic murmur, no gallop or rub.   Lungs:  clear  Abdomen:   Soft, non-tender, bowel sounds are active.   Extremities:  Mild edema  Pulse: symmetric  Neuro: A&O times 3, No focal neuro deficits    Cardiographics    ECG: A sensed, V paced rhythm, PVC      Labs:   Lab Results   Component Value Date/Time    Sodium 138 05/10/2018 12:10 PM    Sodium 142 04/05/2018 03:32 PM    Sodium 144 11/29/2017 03:30 PM    Sodium 142 03/16/2016 03:57 PM    Sodium 141 01/04/2016 11:28 AM    Potassium 3.8 05/10/2018 12:10 PM    Potassium 4.3 04/05/2018 03:32 PM    Potassium 4.9 11/29/2017 03:30 PM    Potassium 5.0 03/16/2016 03:57 PM    Potassium 4.3 01/04/2016 11:28 AM    Chloride 99 05/10/2018 12:10 PM    Chloride 101 04/05/2018 03:32 PM    Chloride 100 11/29/2017 03:30 PM    Chloride 104 03/16/2016 03:57 PM    Chloride 102 01/04/2016 11:28 AM    CO2 29 05/10/2018 12:10 PM    CO2 27 04/05/2018 03:32 PM    CO2 29 11/29/2017 03:30 PM    CO2 24 03/16/2016 03:57 PM    CO2 26 01/04/2016 11:28 AM    Anion gap 10 05/10/2018 12:10 PM    Glucose 138 (H) 05/10/2018 12:10 PM    Glucose 107 (H) 04/05/2018 03:32 PM    Glucose 77 11/29/2017 03:30 PM    Glucose 95 03/16/2016 03:57 PM    Glucose 90 01/04/2016 11:28 AM    BUN 32 (H) 05/10/2018 12:10 PM    BUN 35 04/05/2018 03:32 PM    BUN 46 (H) 11/29/2017 03:30 PM    BUN 38 (H) 03/16/2016 03:57 PM    BUN 33 01/04/2016 11:28 AM    Creatinine 1.90 (H) 05/10/2018 12:10 PM    Creatinine 1.59 (H) 04/05/2018 03:32 PM    Creatinine 1.55 (H) 11/29/2017 03:30 PM    Creatinine 1.63 (H) 03/16/2016 03:57 PM    Creatinine 1.46 (H) 01/04/2016 11:28 AM    BUN/Creatinine ratio 17 05/10/2018 12:10 PM    BUN/Creatinine ratio 22 04/05/2018 03:32 PM    BUN/Creatinine ratio 30 (H) 11/29/2017 03:30 PM    BUN/Creatinine ratio 23 03/16/2016 03:57 PM    BUN/Creatinine ratio 23 01/04/2016 11:28 AM    GFR est AA 30 (L) 05/10/2018 12:10 PM    GFR est AA 31 (L) 04/05/2018 03:32 PM    GFR est AA 32 (L) 11/29/2017 03:30 PM    GFR est AA 31 (L) 03/16/2016 03:57 PM    GFR est AA 35 (L) 01/04/2016 11:28 AM    GFR est non-AA 24 (L) 05/10/2018 12:10 PM    GFR est non-AA 27 (L) 04/05/2018 03:32 PM    GFR est non-AA 28 (L) 11/29/2017 03:30 PM    GFR est non-AA 27 (L) 03/16/2016 03:57 PM    GFR est non-AA 30 (L) 01/04/2016 11:28 AM    Calcium 9.6 05/10/2018 12:10 PM    Calcium 9.5 04/05/2018 03:32 PM    Calcium 9.5 11/29/2017 03:30 PM    Calcium 9.5 03/16/2016 03:57 PM    Calcium 9.7 01/04/2016 11:28 AM    Bilirubin, total 0.8 05/10/2018 12:10 PM    Bilirubin, total 0.5 04/05/2018 03:32 PM    Bilirubin, total 0.5 11/29/2017 03:30 PM    Bilirubin, total 0.4 03/16/2016 03:57 PM    Bilirubin, total 0.4 12/04/2015 09:15 AM    AST (SGOT) 18 05/10/2018 12:10 PM    AST (SGOT) 22 04/05/2018 03:32 PM    AST (SGOT) 19 11/29/2017 03:30 PM    AST (SGOT) 17 03/16/2016 03:57 PM    AST (SGOT) 22 12/04/2015 09:15 AM    Alk. phosphatase 87 05/10/2018 12:10 PM    Alk. phosphatase 79 04/05/2018 03:32 PM    Alk. phosphatase 72 11/29/2017 03:30 PM    Alk. phosphatase 87 03/16/2016 03:57 PM    Alk.  phosphatase 72 12/04/2015 09:15 AM    Protein, total 7.3 05/10/2018 12:10 PM    Protein, total 7.1 04/05/2018 03:32 PM    Protein, total 7.2 11/29/2017 03:30 PM    Protein, total 7.1 03/16/2016 03:57 PM    Protein, total 6.4 12/04/2015 09:15 AM    Albumin 3.0 (L) 05/10/2018 12:10 PM    Albumin 3.9 04/05/2018 03:32 PM    Albumin 4.1 11/29/2017 03:30 PM    Albumin 4.1 03/16/2016 03:57 PM    Albumin 4.0 12/04/2015 09:15 AM    Globulin 4.3 (H) 05/10/2018 12:10 PM    A-G Ratio 0.7 (L) 05/10/2018 12:10 PM    A-G Ratio 1.2 04/05/2018 03:32 PM    A-G Ratio 1.3 11/29/2017 03:30 PM    A-G Ratio 1.4 03/16/2016 03:57 PM    A-G Ratio 1.7 12/04/2015 09:15 AM    ALT (SGPT) 14 05/10/2018 12:10 PM    ALT (SGPT) 9 04/05/2018 03:32 PM    ALT (SGPT) 11 11/29/2017 03:30 PM    ALT (SGPT) 7 03/16/2016 03:57 PM    ALT (SGPT) 10 12/04/2015 09:15 AM     Lab Results   Component Value Date/Time    CK 25 (L) 05/10/2018 12:10 PM     Lab Results   Component Value Date/Time    Cholesterol, total 187 03/16/2016 03:57 PM    HDL Cholesterol 54 03/16/2016 03:57 PM    LDL, calculated 123 (H) 03/16/2016 03:57 PM    Triglyceride 51 03/16/2016 03:57 PM     No results found for this or any previous visit. 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 No CV sx or complaints. Plan:     Doing well with no adverse cardiac symptoms.    Comp stocking/elevate legs  Continue diuretic rx  prob more AI and diastolic CHF, but age limiting  Continue current care and f/u in 6 months with PPM check.    Jemima Jefferson MD

## 2018-06-22 NOTE — MR AVS SNAPSHOT
[facility] 
 
 
 [address] [address] [phone] Patient: Lelo Mayberry MRN: [id number]:[id number] Visit Information Date & Time Provider Department Dept. Phone Encounter #  
 6/22/2018 11:00 AM Jay Villar, [facility] [phone] [id number] Follow-up Instructions Return in about 6 months (around 12/22/2018). Follow-up and Disposition History Your Appointments 7/12/2018  3:00 PM  
ESTABLISHED PATIENT with Arnold Alex MD  
[facility]) Appt Note: 3mo fu  
 1000 [facility] [address] [facility],5[address] [address] [phone]  
  
   
 [address] [address] 1/18/2019 10:40 AM  
ESTABLISHED PATIENT with Jay Villar MD  
[address] [facility] [place] [facility] [place] [address] [facility]) Appt Note: medtronic pacemaker check and 6 mo follow up $0cp 13[address] [facility] [address] [phone]  
  
   
 [address] [place] [address] Upcoming Health Maintenance Date Due Influenza Age 5 to Adult 8/1/2018 GLAUCOMA SCREENING Q2Y 3/31/2019 MEDICARE YEARLY EXAM 4/6/2019 DTaP/Tdap/Td series (2 - Td) 4/25/2027 Allergies as of 6/22/2018  Review Complete On: 6/22/2018 By: Jay Villar MD  
 No Known Allergies Current Immunizations  Reviewed on 7/12/2017 Name Date Influenza High Dose Vaccine PF 9/19/2017  3:25 PM, 11/22/2016  4:35 PM  
 Influenza Vaccine 10/7/2015 Pneumococcal Conjugate (PCV-13) 4/25/2017  4:01 PM  
 Pneumococcal Polysaccharide (PPSV-23) 9/15/2009 Td 8/31/2009 Not reviewed this visit You Were Diagnosed With   
  
 Codes Comments Atrioventricular block, complete (HCC)    -  Primary ICD-10-CM: F18.5 ICD-9-CM: 426.0 Pacemaker     ICD-10-CM: Z95.0 ICD-9-CM: V45.01  Essential hypertension     ICD-10-CM: I10 
 ICD-9-CM: 401.9 Aortic valve insufficiency, etiology of cardiac valve disease unspecified     ICD-10-CM: I35.1 ICD-9-CM: 424.1 Diastolic CHF, chronic (HCC)     ICD-10-CM: I50.32 
ICD-9-CM: 428.32, 428.0 Vitals BP Pulse Resp Height(growth percentile) Weight(growth percentile) SpO2  
 146/80 (BP 1 Location: Left arm, BP Patient Position: Sitting) 77 16 5' 4\" (1.626 m) 123 lb (55.8 kg) 97% BMI Smoking Status 21.11 kg/m2 Never Smoker Vitals History BMI and BSA Data Body Mass Index Body Surface Area  
 21.11 kg/m 2 1.59 m 2 Preferred Pharmacy Pharmacy Name Phone [facility] 79 [phone] Your Updated Medication List  
  
   
This list is accurate as of 6/22/18 11:36 AM.  Always use your most recent med list.  
  
  
  
  
 ALEVE 220 mg Cap Generic drug:  naproxen sodium Take  by mouth daily as needed. BUMEX PO Take 1 mg by mouth daily. Holds on days of appointments COSOPT 22.3-6.8 mg/mL ophthalmic solution Generic drug:  dorzolamide-timolol Administer 1 Drop to both eyes two (2) times a day. LUMIGAN 0.03 % ophthalmic drops Generic drug:  bimatoprost  
Administer 1 Drop to left eye nightly.  
  
 magnesium hydroxide 400 mg/5 mL suspension Commonly known as:  MILK OF MAGNESIA Take 30 mL by mouth daily as needed for Constipation. OTHER(NON-FORMULARY) Indications: Moisture cream with colloidal oatmeal  
  
 polyethylene glycol 17 gram/dose powder Commonly known as:  Sevier Prince MIX 1 CAPFUL (17GM) WITH 4 TO 8 OZ AND DRINK BY MOUTH TWICE DAILY Indications: Constipation  
  
 triamcinolone acetonide 0.1 % topical cream  
Commonly known as:  KENALOG Apply  to affected area two (2) times daily as needed for Skin Irritation. We Performed the Following AMB POC EKG ROUTINE W/ 12 LEADS, INTER & REP [70231 CPT(R)] Follow-up Instructions Return in about 6 months (around 12/22/2018). Introducing [facility] & HEALTH SERVICES! [employer] introduces Triggit patient portal. Now you can access parts of your medical record, email your doctor's office, and request medication refills online. 1. In your internet browser, go to [email or web address] 2. Click on the First Time User? Click Here link in the Sign In box. You will see the New Member Sign Up page. 3. Enter your Triggit Access Code exactly as it appears below. You will not need to use this code after youve completed the sign-up process. If you do not sign up before the expiration date, you must request a new code. · Triggit Access Code: [email or web address] Expires: 6/27/2018  3:31 PM 
 
4. Enter the last four digits of your Social Security Number (xxxx) and Date of Birth (mm/dd/yyyy) as indicated and click Submit. You will be taken to the next sign-up page. 5. Create a Triggit ID. This will be your Triggit login ID and cannot be changed, so think of one that is secure and easy to remember. 6. Create a Triggit password. You can change your password at any time. 7. Enter your Password Reset Question and Answer. This can be used at a later time if you forget your password. 8. Enter your e-mail address. You will receive e-mail notification when new information is available in [address] 9. Click Sign Up. You can now view and download portions of your medical record. 10. Click the Download Summary menu link to download a portable copy of your medical information. If you have questions, please visit the Frequently Asked Questions section of the Triggit website. Remember, Triggit is NOT to be used for urgent needs. For medical emergencies, dial 911. Now available from your iPhone and Android! Please provide this summary of care documentation to your next provider. Your primary care clinician is listed as Hasmukh Mcginnis.  If you have any questions after today's visit, please call [phone].

## 2018-07-12 ENCOUNTER — OFFICE VISIT (OUTPATIENT)
Dept: FAMILY MEDICINE CLINIC | Age: 83
End: 2018-07-12

## 2018-07-12 VITALS
HEIGHT: 64 IN | OXYGEN SATURATION: 98 % | SYSTOLIC BLOOD PRESSURE: 166 MMHG | DIASTOLIC BLOOD PRESSURE: 84 MMHG | BODY MASS INDEX: 20.9 KG/M2 | HEART RATE: 74 BPM | WEIGHT: 122.4 LBS | RESPIRATION RATE: 16 BRPM

## 2018-07-12 DIAGNOSIS — I10 ESSENTIAL HYPERTENSION: Primary | ICD-10-CM

## 2018-07-12 DIAGNOSIS — L57.0 ACTINIC KERATOSES: ICD-10-CM

## 2018-07-12 DIAGNOSIS — R60.0 BILATERAL LEG EDEMA: ICD-10-CM

## 2018-07-12 RX ORDER — DIFLUPREDNATE 0.5 MG/ML
1 EMULSION OPHTHALMIC DAILY
COMMUNITY
End: 2018-10-18 | Stop reason: CLARIF

## 2018-07-12 NOTE — PROGRESS NOTES
1. Have you been to the ER, urgent care clinic since your last visit? Hospitalized since your last visit? No    2. Have you seen or consulted any other health care providers outside of the [address] [place] [address] since your last visit? Include any pap smears or colon screening.  Yes Reason for visit: Optomotrist

## 2018-07-12 NOTE — MR AVS SNAPSHOT
[facility] 
 
 
 [address] [address] [phone] Patient: Abilio Resendiz MRN: [id number] Visit Information Date & Time Provider Department Dept. Phone Encounter #  
 7/12/2018  3:00 PM Kang Horvath MD Raisa Soto [id number] Follow-up Instructions Return in about 2 months (around 9/12/2018). Your Appointments 10/8/2018  8:00 AM  
PROCEDURE with [facility]) Appt Note: NOT AN OFFICE VISIT - REMOTE MDT PM (pt's niece will send when she gets off work in the evening) [address] [place] [address]  
[phone] [address] [place] [address]  
  
    
 1/18/2019 10:40 AM  
ESTABLISHED PATIENT with Vianca Evangelista MD  
[address] [facility]) Appt Note: medtronic pacemaker check and 6 mo follow up $0cp [address] [id number] [phone]  
  
   
 [address] [place] [address] Upcoming Health Maintenance Date Due Influenza Age 5 to Adult 8/1/2018 GLAUCOMA SCREENING Q2Y 3/31/2019 MEDICARE YEARLY EXAM 4/6/2019 DTaP/Tdap/Td series (2 - Td) 4/25/2027 Allergies as of 7/12/2018  Review Complete On: 6/22/2018 By: Vianca Evangelista MD  
 No Known Allergies Current Immunizations  Reviewed on 7/12/2017 Name Date Influenza High Dose Vaccine PF 9/19/2017  3:25 PM, 11/22/2016  4:35 PM  
 Influenza Vaccine 10/7/2015 Pneumococcal Conjugate (PCV-13) 4/25/2017  4:01 PM  
 Pneumococcal Polysaccharide (PPSV-23) 9/15/2009 Td 8/31/2009 Not reviewed this visit You Were Diagnosed With   
  
 Codes Comments Essential hypertension    -  Primary ICD-10-CM: I10 
ICD-9-CM: 401.9 Bilateral leg edema     ICD-10-CM: R60.0 ICD-9-CM: 782.3 Actinic keratoses     ICD-10-CM: L57.0 ICD-9-CM: 702.0 Vitals BP Pulse Resp Height(growth percentile) Weight(growth percentile) SpO2  
 166/84 (BP 1 Location: Left arm, BP Patient Position: Sitting) 74 16 5' 4\" (1.626 m) 122 lb 6.4 oz (55.5 kg) 98% BMI Smoking Status 21.01 kg/m2 Never Smoker BMI and BSA Data Body Mass Index Body Surface Area 21.01 kg/m 2 1.58 m 2 Preferred Pharmacy Pharmacy Name Phone [facility] [place] 79 [phone] Your Updated Medication List  
  
   
This list is accurate as of 7/12/18  3:40 PM.  Always use your most recent med list.  
  
  
  
  
 ALEVE 220 mg Cap Generic drug:  naproxen sodium Take  by mouth daily as needed. BUMEX PO Take 1 mg by mouth daily. Holds on days of appointments COSOPT 22.3-6.8 mg/mL ophthalmic solution Generic drug:  dorzolamide-timolol Administer 1 Drop to both eyes two (2) times a day. Difluprednate 0.05 % ophthalmic emulsion Commonly known as:  Nisreen Dang Administer 1 Drop to right eye daily. LUMIGAN 0.03 % ophthalmic drops Generic drug:  bimatoprost  
Administer 1 Drop to left eye nightly.  
  
 magnesium hydroxide 400 mg/5 mL suspension Commonly known as:  MILK OF MAGNESIA Take 30 mL by mouth daily as needed for Constipation. OTHER(NON-FORMULARY) Indications: Moisture cream with colloidal oatmeal  
  
 polyethylene glycol 17 gram/dose powder Commonly known as:  Yojana Boer MIX 1 CAPFUL (17GM) WITH 4 TO 8 OZ AND DRINK BY MOUTH TWICE DAILY Indications: Constipation  
  
 triamcinolone acetonide 0.1 % topical cream  
Commonly known as:  KENALOG Apply  to affected area two (2) times daily as needed for Skin Irritation. We Performed the Following DESTRUC PREMALIGNANT, FIRST LESION [88793 CPT(R)] [address] LESIONS [17615 CPT(R)] Follow-up Instructions Return in about 2 months (around 9/12/2018). Patient Instructions If you have any questions regarding Regalii, you may call Regalii support at [phone]. Introducing [facility] SERVICES! [facility] introduces [employer] patient portal. Now you can access parts of your medical record, email your doctor's office, and request medication refills online. 1. In your internet browser, go to [email or web address] 2. Click on the First Time User? Click Here link in the Sign In box. You will see the New Member Sign Up page. 3. Enter your [employer] Access Code exactly as it appears below. You will not need to use this code after youve completed the sign-up process. If you do not sign up before the expiration date, you must request a new code. · [employer] Access Code: [email or web address] Expires: 9/27/2018  3:33 PM 
 
4. Enter the last four digits of your Social Security Number (xxxx) and Date of Birth (mm/dd/yyyy) as indicated and click Submit. You will be taken to the next sign-up page. 5. Create a [employer] ID. This will be your [employer] login ID and cannot be changed, so think of one that is secure and easy to remember. 6. Create a [employer] password. You can change your password at any time. 7. Enter your Password Reset Question and Answer. This can be used at a later time if you forget your password. 8. Enter your e-mail address. You will receive e-mail notification when new information is available in [address] 9. Click Sign Up. You can now view and download portions of your medical record. 10. Click the Download Summary menu link to download a portable copy of your medical information. If you have questions, please visit the Frequently Asked Questions section of the [employer] website. Remember, [employer] is NOT to be used for urgent needs. For medical emergencies, dial 911. Now available from your iPhone and Android! Please provide this summary of care documentation to your next provider. Your primary care clinician is listed as Al Cho. If you have any questions after today's visit, please call [phone].

## 2018-07-12 NOTE — PROGRESS NOTES
Chief Complaint   Patient presents with    Vision Change         HPI:       is a 80 y.o. female retired  who lives alone. Remote history of treatment for a B cell lymphoma (chemo). There is longstanding CRI, compensated CHF, HTN, pacemaker implantation and shingles. For the past several months she has required daily diuretic therapy to manage her CHF and leg edema, which, at times, was massive and oozing. This is no longer the case. No Known Allergies    Current Outpatient Prescriptions   Medication Sig    prednisoLONE acetate (PRED FORTE) 1 % ophthalmic suspension Administer 1 Drop to both eyes four (4) times daily.  polyethylene glycol (MIRALAX) 17 gram/dose powder MIX 1 CAPFUL (17GM) WITH 4 TO 8 OZ AND DRINK BY MOUTH TWICE DAILY Indications: Constipation    lactulose (CHRONULAC) 10 gram/15 mL solution Take  by mouth three (3) times daily.  bumetanide (BUMEX) 1 mg tablet TAKE 2 TABLETS BY MOUTH DAILY FOR SWELLING    naproxen sodium (ALEVE) 220 mg cap Take  by mouth daily as needed.  triamcinolone acetonide (KENALOG) 0.1 % topical cream Apply  to affected area two (2) times daily as needed for Skin Irritation.  magnesium hydroxide (MILK OF MAGNESIA) 400 mg/5 mL suspension Take 30 mL by mouth daily as needed for Constipation.  OTHER,NON-FORMULARY, Indications: Moisture cream with colloidal oatmeal    dorzolamide-timolol (COSOPT) 2-0.5 % ophthalmic solution Administer 1 Drop to both eyes two (2) times a day.  bimatoprost (LUMIGAN) 0.03 % ophthalmic drops Administer 1 Drop to left eye nightly. No current facility-administered medications for this visit.         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 Denies wt loss, wt gain, hemoptysis, hematochezia or melena. Physical Examination:    /84 (BP 1 Location: Left arm, BP Patient Position: Sitting)  Pulse 74  Resp 16  Ht 5' 4\" (1.626 m)  Wt 122 lb 6.4 oz (55.5 kg)  SpO2 98%  BMI 21.01 kg/m2      General: Alert and Ox3, Fluent speech  HEENT:  NC/AT, EOMI, OP: clear  Neck:  Supple, no adenopathy, JVD, mass or bruit  Chest:  Clear to Ausculation, without wheezes, rales, rubs or ronchi  Cardiac: RRR with a 3/6 THERON  Abdomen:  +BS, soft, nontender without palpable HSM  Extremities:  No cyanosis, clubbing or edema  Neurologic:  Ambulatory without assist, CN 2-12 grossly intact. Moves all extremities. Skin: scaly crusts on face  Lymphadenopathy: no cervical or supraclavicular nodes      Procedure Note:  Cryotherapy for the treatment of Actinic Keratoses    The risks, benefits and alternatives to treatment were carefully explained to the patient who voiced understanding and  desires to proceed. With the patient's verbal permission, 4 actinic keratoses were treated with 2 applications of Liquid Nitrogen each. The patient tolerated the procedure well and there were no complications. The patient was instructed to RTC for follow up if redness, swelling or new lesions emerge. ASSESSMENT AND PLAN:     1. Leg edema:  Looks good. Continue with current diuretic protocol  2. HTN is well controlled  3.   AKs: treated with LN2

## 2018-07-12 NOTE — PATIENT INSTRUCTIONS
If you have any questions regarding [employer], you may call [employer] support at [phone].

## 2018-10-10 ENCOUNTER — CLINICAL SUPPORT (OUTPATIENT)
Dept: CARDIOLOGY CLINIC | Age: 83
End: 2018-10-10

## 2018-10-10 DIAGNOSIS — Z95.0 PACEMAKER: ICD-10-CM

## 2018-10-10 DIAGNOSIS — I44.2 ATRIOVENTRICULAR BLOCK, COMPLETE (HCC): Primary | ICD-10-CM

## 2019-01-01 ENCOUNTER — CLINICAL SUPPORT (OUTPATIENT)
Dept: CARDIOLOGY CLINIC | Age: 84
End: 2019-01-01

## 2019-01-01 ENCOUNTER — OFFICE VISIT (OUTPATIENT)
Dept: CARDIOLOGY CLINIC | Age: 84
End: 2019-01-01

## 2019-01-01 VITALS
RESPIRATION RATE: 16 BRPM | BODY MASS INDEX: 19.46 KG/M2 | SYSTOLIC BLOOD PRESSURE: 122 MMHG | DIASTOLIC BLOOD PRESSURE: 80 MMHG | HEIGHT: 64 IN | OXYGEN SATURATION: 100 % | HEART RATE: 74 BPM | WEIGHT: 114 LBS

## 2019-01-01 DIAGNOSIS — I35.1 NONRHEUMATIC AORTIC VALVE INSUFFICIENCY: ICD-10-CM

## 2019-01-01 DIAGNOSIS — I44.2 ATRIOVENTRICULAR BLOCK, COMPLETE (HCC): Primary | ICD-10-CM

## 2019-01-01 DIAGNOSIS — G30.9 ALZHEIMER'S DEMENTIA WITHOUT BEHAVIORAL DISTURBANCE, UNSPECIFIED TIMING OF DEMENTIA ONSET: ICD-10-CM

## 2019-01-01 DIAGNOSIS — I10 ESSENTIAL HYPERTENSION: ICD-10-CM

## 2019-01-01 DIAGNOSIS — F02.80 ALZHEIMER'S DEMENTIA WITHOUT BEHAVIORAL DISTURBANCE, UNSPECIFIED TIMING OF DEMENTIA ONSET: ICD-10-CM

## 2019-01-01 DIAGNOSIS — I44.2 ATRIOVENTRICULAR BLOCK, COMPLETE (HCC): ICD-10-CM

## 2019-01-01 DIAGNOSIS — Z95.0 CARDIAC PACEMAKER IN SITU: Primary | ICD-10-CM

## 2019-01-01 DIAGNOSIS — Z95.0 CARDIAC PACEMAKER IN SITU: ICD-10-CM

## 2019-01-01 DIAGNOSIS — N18.9 CRI (CHRONIC RENAL INSUFFICIENCY), UNSPECIFIED STAGE: ICD-10-CM

## 2019-01-01 DIAGNOSIS — I50.32 DIASTOLIC CHF, CHRONIC (HCC): ICD-10-CM

## 2019-01-01 DIAGNOSIS — R60.0 BILATERAL LEG EDEMA: ICD-10-CM

## 2019-01-07 PROBLEM — K59.00 CONSTIPATION: Status: ACTIVE | Noted: 2019-01-07

## 2019-01-07 PROBLEM — L03.90 CELLULITIS: Status: ACTIVE | Noted: 2019-01-07

## 2019-01-07 PROBLEM — G30.9 ALZHEIMER'S DEMENTIA (HCC): Status: ACTIVE | Noted: 2019-01-07

## 2019-01-07 PROBLEM — F02.80 ALZHEIMER'S DEMENTIA (HCC): Status: ACTIVE | Noted: 2019-01-07

## 2019-03-22 ENCOUNTER — OFFICE VISIT (OUTPATIENT)
Dept: CARDIOLOGY CLINIC | Age: 84
End: 2019-03-22

## 2019-03-22 ENCOUNTER — TELEPHONE (OUTPATIENT)
Dept: CARDIOLOGY CLINIC | Age: 84
End: 2019-03-22

## 2019-03-22 ENCOUNTER — CLINICAL SUPPORT (OUTPATIENT)
Dept: CARDIOLOGY CLINIC | Age: 84
End: 2019-03-22

## 2019-03-22 VITALS — BODY MASS INDEX: 20.6 KG/M2 | HEIGHT: 64 IN

## 2019-03-22 DIAGNOSIS — Z95.0 CARDIAC PACEMAKER IN SITU: ICD-10-CM

## 2019-03-22 DIAGNOSIS — I44.2 ATRIOVENTRICULAR BLOCK, COMPLETE (HCC): ICD-10-CM

## 2019-03-22 DIAGNOSIS — I50.32 DIASTOLIC CHF, CHRONIC (HCC): ICD-10-CM

## 2019-03-22 DIAGNOSIS — Z95.0 CARDIAC PACEMAKER IN SITU: Primary | ICD-10-CM

## 2019-03-22 DIAGNOSIS — I10 ESSENTIAL HYPERTENSION: ICD-10-CM

## 2019-03-22 DIAGNOSIS — N18.9 CRI (CHRONIC RENAL INSUFFICIENCY), UNSPECIFIED STAGE: ICD-10-CM

## 2019-03-22 DIAGNOSIS — I44.2 ATRIOVENTRICULAR BLOCK, COMPLETE (HCC): Primary | ICD-10-CM

## 2019-03-22 DIAGNOSIS — I35.1 NONRHEUMATIC AORTIC VALVE INSUFFICIENCY: ICD-10-CM

## 2019-03-22 DIAGNOSIS — R60.0 BILATERAL LEG EDEMA: ICD-10-CM

## 2019-03-22 NOTE — TELEPHONE ENCOUNTER
Please call patients daughter Shasta Field)     Can she answer questions regarding patient prior to patients appointment. Patient hard of hearing and person bringing them will not know the answers.     Cell # [phone]      Danny Marina

## 2019-03-22 NOTE — PROGRESS NOTES
Nakia Reis is a 80 y.o. female is here for routine f/u and PPM check. No specific CV sx or complaints. Chronic LE edema and GARCIA. Hospitalized in January with venous stasis/cellulitis etc (see notes). The patient denies chest pain, orthopnea, PND,  palpitations, syncope, presyncope or fatigue.        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    Atrioventricular block, complete ([address]     HCVD (hypertensive cardiovascular disease)     CRI (chronic renal insufficiency)     Aortic valve insufficiency     Carotid disease, bilateral ([address]       Vijay Grider MD  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    B-cell lymphoma ([address]     Bronchitis     Carotid disease, bilateral ([address]     Cholelithiasis 2009    Clostridium difficile colitis July 17, 2016    [facility] admission    Constipation     CRI (chronic renal insufficiency)     Dyspepsia and other specified disorders of function of stomach     Frequent urination     Glaucoma     HCVD (hypertensive cardiovascular disease)     Hemorrhoids     History of seasonal allergies     Hypercholesterolemia       Past Surgical History:   Procedure Laterality Date    HX HIP FRACTURE TX Left     HX LOBECTOMY Right 11/1998    middle    HX PACEMAKER  2003    DDD    HX PACEMAKER  5/2012    Gen Change     No Known Allergies   Family History   Problem Relation Age of Onset    Heart Attack Sister     Coronary Artery Disease Mother     Heart Attack Mother     Cancer Mother         lung    Heart Disease Mother         Renal failure    Hypertension Mother     Heart Attack Father       Social History     Socioeconomic History    Marital status: SINGLE     Spouse name: Not on file    Number of children: Not on file    Years of education: Not on file    Highest education level: Not on file   Occupational History    Occupation: office work     Employer: RETIRED   Social Needs    Financial resource strain: Not on file    Food insecurity:     Worry: Not on file     Inability: Not on file   [facility] needs:     Medical: Not on file     Non-medical: Not on file   Tobacco Use    Smoking status: Never Smoker    Smokeless tobacco: Never Used   Substance and Sexual Activity    Alcohol use: No     Alcohol/week: 0.0 oz    Drug use: Not on file    Sexual activity: Not on file   Lifestyle    Physical activity:     Days per week: Not on file     Minutes per session: Not on file    Stress: Not on file   Relationships    Social connections:     Talks on phone: Not on file     Gets together: Not on file     Attends Gnosticist service: Not on file     Active member of club or organization: Not on file     Attends meetings of clubs or organizations: Not on file     Relationship status: Not on file    Intimate partner violence:     Fear of current or ex partner: Not on file     Emotionally abused: Not on file     Physically abused: Not on file     Forced sexual activity: Not on file   Other Topics Concern     Service No    Blood Transfusions No    Caffeine Concern No    Occupational Exposure No    Hobby Hazards No    Sleep Concern No    Stress Concern No    Weight Concern No    Special Diet No    Back Care No    Exercise No     Comment: still lives alone and cares for home    Bike Helmet No    Seat Belt Yes    Self-Exams No   Social History Narrative    ** Merged History Encounter **           Current Outpatient Medications   Medication Sig    dorzolamide-timolol (COSOPT) 22.3-6.8 mg/mL ophthalmic solution     bumetanide (BUMEX) 1 mg tablet Take 1 Tab by mouth two (2) times a day. (Patient taking differently: Take 1 mg by mouth daily.)    benzocaine-menthol (CEPACOL) 15-3.6 mg lozg lozenge 1 Lozenge by Mucous Membrane route as needed.  silver sulfADIAZINE (SILVADENE) 1 % topical cream Apply 1 Each to affected area daily.  potassium chloride SR (K-TAB) 20 mEq tablet Take 1 Tab by mouth daily.  polyethylene glycol (MIRALAX) 17 gram/dose powder MIX 1 CAPFUL (17GM) WITH 4 TO 8 OZ AND DRINK BY MOUTH TWICE DAILY Indications: Constipation    dextran 70-hypromellose (ARTIFICIAL TEARS,[id number],) ophthalmic solution Administer  to both eyes as needed.  naproxen sodium (ALEVE) 220 mg cap Take  by mouth daily as needed.  triamcinolone acetonide (KENALOG) 0.1 % topical cream Apply  to affected area two (2) times daily as needed for Skin Irritation.  magnesium hydroxide (MILK OF MAGNESIA) 400 mg/5 mL suspension Take 30 mL by mouth daily as needed for Constipation.  OTHER,NON-FORMULARY, Indications: Moisture cream with colloidal oatmeal    bimatoprost (LUMIGAN) 0.03 % ophthalmic drops Administer 1 Drop to left eye nightly. No current facility-administered medications for this visit. Review of Symptoms:    CONST  No weight change. No fever, chills, sweats    ENT No visual changes, URI sx, sore throat    CV  See HPI   RESP  No cough, or sputum, wheezing. Also see HPI   GI  No abdominal pain or change in bowel habits. No heartburn or dysphagia. No melena or rectal bleeding.   No dysuria, urgency, frequency, hematuria   MSKEL  No joint pain, swelling. No muscle pain. SKIN  No rash or lesions. NEURO  No headache, syncope, or seizure. No weakness, loss of sensation, or paresthesias. PSYCH  No low mood or depression  No anxiety. HE/LYMPH  No easy bruising, abnormal bleeding, or enlarged glands.         Physical ExamPhysical Exam:    Visit Vitals  Ht 5' 4\" (1.626 m)   BMI 20.60 kg/m²     Gen: NAD  HEENT:  PERRL, throat clear  Neck: no adenopathy, no thyromegaly, no JVD   Heart:  Regular,Nl S1S2,  no murmur, gallop or rub.   Lungs:  clear  Abdomen:   Soft, non-tender, bowel sounds are active.   Extremities:  No edema  Pulse: symmetric  Neuro: A&O times 3, No focal neuro deficits    Cardiographics        Labs:   Lab Results   Component Value Date/Time    Sodium 142 01/10/2019 05:45 AM    Sodium 145 01/09/2019 06:00 AM    Sodium 144 01/08/2019 06:00 AM    Sodium 144 01/07/2019 03:45 PM    Sodium 138 05/10/2018 12:10 PM    Potassium 3.9 01/10/2019 05:45 AM    Potassium 4.2 01/09/2019 06:00 AM    Potassium 4.5 01/08/2019 06:00 AM    Potassium 3.6 01/07/2019 03:45 PM    Potassium 3.8 05/10/2018 12:10 PM    Chloride 105 01/10/2019 05:45 AM    Chloride 108 01/09/2019 06:00 AM    Chloride 106 01/08/2019 06:00 AM    Chloride 105 01/07/2019 03:45 PM    Chloride 99 05/10/2018 12:10 PM    CO2 31 01/10/2019 05:45 AM    CO2 29 01/09/2019 06:00 AM    CO2 31 01/08/2019 06:00 AM    CO2 34 (H) 01/07/2019 03:45 PM    CO2 29 05/10/2018 12:10 PM    Anion gap 6 01/10/2019 05:45 AM    Anion gap 8 01/09/2019 06:00 AM    Anion gap 7 01/08/2019 06:00 AM    Anion gap 5 01/07/2019 03:45 PM    Anion gap 10 05/10/2018 12:10 PM    Glucose 81 01/10/2019 05:45 AM    Glucose 76 01/09/2019 06:00 AM    Glucose 82 01/08/2019 06:00 AM    Glucose 124 (H) 01/07/2019 03:45 PM    Glucose 138 (H) 05/10/2018 12:10 PM    BUN 38 (H) 01/10/2019 05:45 AM    BUN 38 (H) 01/09/2019 06:00 AM    BUN 42 (H) 01/08/2019 06:00 AM    BUN 40 (H) 01/07/2019 03:45 PM    BUN 32 (H) 05/10/2018 12:10 PM    Creatinine 1.92 (H) 01/10/2019 05:45 AM    Creatinine 1.76 (H) 01/09/2019 06:00 AM    Creatinine 1.81 (H) 01/08/2019 06:00 AM    Creatinine 1.89 (H) 01/07/2019 03:45 PM    Creatinine 1.90 (H) 05/10/2018 12:10 PM    BUN/Creatinine ratio 20 01/10/2019 05:45 AM    BUN/Creatinine ratio 22 (H) 01/09/2019 06:00 AM BUN/Creatinine ratio 23 (H) 01/08/2019 06:00 AM    BUN/Creatinine ratio 21 (H) 01/07/2019 03:45 PM    BUN/Creatinine ratio 17 05/10/2018 12:10 PM    GFR est AA 29 (L) 01/10/2019 05:45 AM    GFR est AA 32 (L) 01/09/2019 06:00 AM    GFR est AA 31 (L) 01/08/2019 06:00 AM    GFR est AA 30 (L) 01/07/2019 03:45 PM    GFR est AA 30 (L) 05/10/2018 12:10 PM    GFR est non-AA 24 (L) 01/10/2019 05:45 AM    GFR est non-AA 27 (L) 01/09/2019 06:00 AM    GFR est non-AA 26 (L) 01/08/2019 06:00 AM    GFR est non-AA 25 (L) 01/07/2019 03:45 PM    GFR est non-AA 24 (L) 05/10/2018 12:10 PM    Calcium 8.4 (L) 01/10/2019 05:45 AM    Calcium 8.8 01/09/2019 06:00 AM    Calcium 8.4 (L) 01/08/2019 06:00 AM    Calcium 8.6 01/07/2019 03:45 PM    Calcium 9.6 05/10/2018 12:10 PM    Bilirubin, total 0.5 01/07/2019 03:45 PM    Bilirubin, total 0.8 05/10/2018 12:10 PM    Bilirubin, total 0.5 04/05/2018 03:32 PM    Bilirubin, total 0.5 11/29/2017 03:30 PM    Bilirubin, total 0.4 03/16/2016 03:57 PM    AST (SGOT) 17 01/07/2019 03:45 PM    AST (SGOT) 18 05/10/2018 12:10 PM    AST (SGOT) 22 04/05/2018 03:32 PM    AST (SGOT) 19 11/29/2017 03:30 PM    AST (SGOT) 17 03/16/2016 03:57 PM    Alk. phosphatase 98 01/07/2019 03:45 PM    Alk. phosphatase 87 05/10/2018 12:10 PM    Alk. phosphatase 79 04/05/2018 03:32 PM    Alk. phosphatase 72 11/29/2017 03:30 PM    Alk.  phosphatase 87 03/16/2016 03:57 PM    Protein, total 6.6 01/07/2019 03:45 PM    Protein, total 7.3 05/10/2018 12:10 PM    Protein, total 7.1 04/05/2018 03:32 PM    Protein, total 7.2 11/29/2017 03:30 PM    Protein, total 7.1 03/16/2016 03:57 PM    Albumin 2.7 (L) 01/07/2019 03:45 PM    Albumin 3.0 (L) 05/10/2018 12:10 PM    Albumin 3.9 04/05/2018 03:32 PM    Albumin 4.1 11/29/2017 03:30 PM    Albumin 4.1 03/16/2016 03:57 PM    Globulin 3.9 01/07/2019 03:45 PM    Globulin 4.3 (H) 05/10/2018 12:10 PM    A-G Ratio 0.7 (L) 01/07/2019 03:45 PM    A-G Ratio 0.7 (L) 05/10/2018 12:10 PM    A-G Ratio 1.2 04/05/2018 03:32 PM    A-G Ratio 1.3 11/29/2017 03:30 PM    A-G Ratio 1.4 03/16/2016 03:57 PM    ALT (SGPT) 12 01/07/2019 03:45 PM    ALT (SGPT) 14 05/10/2018 12:10 PM    ALT (SGPT) 9 04/05/2018 03:32 PM    ALT (SGPT) 11 11/29/2017 03:30 PM    ALT (SGPT) 7 03/16/2016 03:57 PM     Lab Results   Component Value Date/Time    CK 25 (L) 05/10/2018 12:10 PM     Lab Results   Component Value Date/Time    Cholesterol, total 187 03/16/2016 03:57 PM    HDL Cholesterol 54 03/16/2016 03:57 PM    LDL, calculated 123 (H) 03/16/2016 03:57 PM    Triglyceride 51 03/16/2016 03:57 PM     No results found for this or any previous visit. 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 No specific CV sx or complaints. Chronic LE edema and GARCIA. Hospitalized in January with venous stasis/cellulitis etc (see notes). Plan:     Doing well with no adverse cardiac symptoms. PPM check today in office--see scanned. Lipids and labs followed by PCP. Continue current care and f/u in 6 months.     Gale Holder MD

## 2019-03-22 NOTE — PROGRESS NOTES
Verified patient with two patient identifiers. Medications reviewed/approved by Dr. Venecia Uribe. A verbal from Dr. Venecia Uribe was given to remove any medications that were deleted during the visit. Medication(s) removed: potassium chloride SR (K-TAB) 20 mEq tablet   magnesium hydroxide (MILK OF MAGNESIA) 400 mg/5 mL suspension   dorzolamide-timolol (COSOPT) 22.3-6.8 mg/mL ophthalmic solution         Chief Complaint   Patient presents with    Slow Heart Rate     Medtronic pacemaker check and 6 month follow up    Leg Swelling    Hypertension    Pacemaker Check     1. Have you been to the ER, urgent care clinic since your last visit? Hospitalized since your last visit?no    2. Have you seen or consulted any other health care providers outside of the [address] [place] [address] since your last visit? Include any pap smears or colon screening.  no

## 2019-10-18 NOTE — PROGRESS NOTES
Verified patient with two patient identifiers. Medications reviewed/approved by Dr. Aarti Myles. A verbal order from Dr. Aarti Myles was received with [facility] to remove any medications that were deleted during the visit. Medication(s) removed:  None    Chief Complaint   Patient presents with    Leg Swelling     Medtronic pacemaker check and 6 month follow up    Hypertension    Pacemaker Check     1. Have you been to the ER, urgent care clinic since your last visit? Hospitalized since your last visit?no    2. Have you seen or consulted any other health care providers outside of the [address] [place] [address] since your last visit? Include any pap smears or colon screening.  no

## 2019-10-18 NOTE — PROGRESS NOTES
Major Click is a 80 y.o. female is here for routine f/u and PPM check. Residing at [facility]. No specific complaints. Edema actually improved over past several mos, doing well. The patient denies chest pain, orthopnea, PND,  palpitations, syncope, presyncope or fatigue.        Patient Active Problem List    Diagnosis Date Noted    Cellulitis 01/07/2019    Alzheimer's dementia ([address] 01/07/2019    Constipation 01/07/2019    Pacemaker 06/22/2018    Essential hypertension 11/17/2017    Post herpetic neuralgia 09/19/2017    Bilateral leg edema 02/21/2017    Clostridium difficile colitis 07/17/2016    Squamous cell carcinoma of left wrist 01/29/2015    Atrioventricular block, complete ([address]     HCVD (hypertensive cardiovascular disease)     CRI (chronic renal insufficiency)     Aortic valve insufficiency     Carotid disease, bilateral ([address]       Marino Grider MD  Past Medical History:   Diagnosis Date    Anemia     Aortic valve insufficiency     Arthritis     Atrioventricular block, 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    B-cell lymphoma ([address]     Bronchitis     Carotid disease, bilateral ([address]     Cholelithiasis 2009    Clostridium difficile colitis July 17, 2016    [facility] admission    Constipation     CRI (chronic renal insufficiency)     Dyspepsia and other specified disorders of function of stomach     Frequent urination     Glaucoma     HCVD (hypertensive cardiovascular disease)     Hemorrhoids     History of seasonal allergies     Hypercholesterolemia       Past Surgical History:   Procedure Laterality Date    HX HIP FRACTURE TX Left     HX LOBECTOMY Right 11/1998    middle    HX PACEMAKER  2003    DDD    HX PACEMAKER  5/2012    Gen Change     No Known Allergies   Family History   Problem Relation Age of Onset    Heart Attack Sister     Coronary Artery Disease Mother     Heart Attack Mother     Cancer Mother         lung    Heart Disease Mother         Renal failure    Hypertension Mother     Heart Attack Father       Social History     Socioeconomic History    Marital status: SINGLE     Spouse name: Not on file    Number of children: Not on file    Years of education: Not on file    Highest education level: Not on file   Occupational History    Occupation: office work     Employer: RETIRED   Social Needs    Financial resource strain: Not on file    Food insecurity:     Worry: Not on file     Inability: Not on file   Heyo needs:     Medical: Not on file     Non-medical: Not on file   Tobacco Use    Smoking status: Never Smoker    Smokeless tobacco: Never Used   Substance and Sexual Activity    Alcohol use: No     Alcohol/week: 0.0 standard drinks    Drug use: Never    Sexual activity: Not Currently   Lifestyle    Physical activity:     Days per week: Not on file     Minutes per session: Not on file    Stress: Not on file   Relationships    Social connections:     Talks on phone: Not on file     Gets together: Not on file     Attends [employer] service: Not on file     Active member of club or organization: Not on file     Attends meetings of clubs or organizations: Not on file     Relationship status: Not on file    Intimate partner violence:     Fear of current or ex partner: Not on file     Emotionally abused: Not on file     Physically abused: Not on file     Forced sexual activity: Not on file   Other Topics Concern     Service No    Blood Transfusions No    Caffeine Concern No    Occupational Exposure No    Hobby Hazards No    Sleep Concern No    Stress Concern No    Weight Concern No    Special Diet No    Back Care No    Exercise No     Comment: still lives alone and cares for home    Bike Helmet No    Seat Belt Yes    Self-Exams No   Social History Narrative    ** Merged History Encounter **           Current Outpatient Medications   Medication Sig    dorzolamide HCl (DORZOLAMIDE OP) Apply 1 Drop to eye two (2) times a day. Left eye    magnesium hydroxide (MILK OF MAGNESIA PO) Take  by mouth as needed.  bumetanide (BUMEX) 1 mg tablet TAKE 1 TABLET BY MOUTH TWICE DAILY (Patient taking differently: daily.)    dextran 70-hypromellose (ARTIFICIAL TEARS,[id number],) ophthalmic solution Administer  to both eyes as needed.  naproxen sodium (ALEVE) 220 mg cap Take  by mouth daily as needed.  bimatoprost (LUMIGAN) 0.03 % ophthalmic drops Administer 1 Drop to left eye nightly.  silver sulfADIAZINE (SILVADENE) 1 % topical cream Apply 1 Each to affected area daily.  triamcinolone acetonide (KENALOG) 0.1 % topical cream Apply  to affected area two (2) times daily as needed for Skin Irritation.  OTHER,NON-FORMULARY, Indications: Moisture cream with colloidal oatmeal     No current facility-administered medications for this visit. Review of Symptoms:    CONST  No weight change. No fever, chills, sweats    ENT No visual changes, URI sx, sore throat    CV  See HPI   RESP  No cough, or sputum, wheezing. Also see HPI   GI  No abdominal pain or change in bowel habits. No heartburn or dysphagia. No melena or rectal bleeding.   No dysuria, urgency, frequency, hematuria   MSKEL  No joint pain, swelling. No muscle pain. SKIN  No rash or lesions. NEURO  No headache, syncope, or seizure. No weakness, loss of sensation, or paresthesias. PSYCH  No low mood or depression  No anxiety. HE/LYMPH  No easy bruising, abnormal bleeding, or enlarged glands.         Physical ExamPhysical Exam:    Visit Vitals  /80 (BP 1 Location: Left arm, BP Patient Position: Sitting)   Pulse 74   Resp 16   Ht 5' 4\" (1.626 m)   Wt 114 lb (51.7 kg)   SpO2 100% Comment: ra   BMI 19.57 kg/m²     Gen: NAD  HEENT:  PERRL, throat clear  Neck: no adenopathy, no thyromegaly, no JVD   Heart:  Regular,Nl I8Q7,  II/VI diastolic murmur, no gallop or rub.   Lungs:  clear  Abdomen:   Soft, non-tender, bowel sounds are active.   Extremities: Minimal edema  Pulse: symmetric  Neuro: A&O times 3, No focal neuro deficits    Cardiographics    ECG: A sensing, V pacing    Labs:   Lab Results   Component Value Date/Time    Sodium 143 09/01/2019 09:30 PM    Sodium 142 01/10/2019 05:45 AM    Sodium 145 01/09/2019 06:00 AM    Sodium 144 01/08/2019 06:00 AM    Sodium 144 01/07/2019 03:45 PM    Potassium 3.7 09/01/2019 09:30 PM    Potassium 3.9 01/10/2019 05:45 AM    Potassium 4.2 01/09/2019 06:00 AM    Potassium 4.5 01/08/2019 06:00 AM    Potassium 3.6 01/07/2019 03:45 PM    Chloride 104 09/01/2019 09:30 PM    Chloride 105 01/10/2019 05:45 AM    Chloride 108 01/09/2019 06:00 AM    Chloride 106 01/08/2019 06:00 AM    Chloride 105 01/07/2019 03:45 PM    CO2 33 (H) 09/01/2019 09:30 PM    CO2 31 01/10/2019 05:45 AM    CO2 29 01/09/2019 06:00 AM    CO2 31 01/08/2019 06:00 AM    CO2 34 (H) 01/07/2019 03:45 PM    Anion gap 6 09/01/2019 09:30 PM    Anion gap 6 01/10/2019 05:45 AM    Anion gap 8 01/09/2019 06:00 AM    Anion gap 7 01/08/2019 06:00 AM    Anion gap 5 01/07/2019 03:45 PM    Glucose 131 (H) 09/01/2019 09:30 PM    Glucose 81 01/10/2019 05:45 AM    Glucose 76 01/09/2019 06:00 AM    Glucose 82 01/08/2019 06:00 AM    Glucose 124 (H) 01/07/2019 03:45 PM    BUN 49 (H) 09/01/2019 09:30 PM    BUN 38 (H) 01/10/2019 05:45 AM    BUN 38 (H) 01/09/2019 06:00 AM    BUN 42 (H) 01/08/2019 06:00 AM    BUN 40 (H) 01/07/2019 03:45 PM    Creatinine 2.06 (H) 09/01/2019 09:30 PM    Creatinine 1.92 (H) 01/10/2019 05:45 AM    Creatinine 1.76 (H) 01/09/2019 06:00 AM    Creatinine 1.81 (H) 01/08/2019 06:00 AM    Creatinine 1.89 (H) 01/07/2019 03:45 PM    BUN/Creatinine ratio 24 (H) 09/01/2019 09:30 PM    BUN/Creatinine ratio 20 01/10/2019 05:45 AM    BUN/Creatinine ratio 22 (H) 01/09/2019 06:00 AM    BUN/Creatinine ratio 23 (H) 01/08/2019 06:00 AM    BUN/Creatinine ratio 21 (H) 01/07/2019 03:45 PM    GFR est AA 27 (L) 09/01/2019 09:30 PM    GFR est AA 29 (L) 01/10/2019 05:45 AM    GFR est AA 32 (L) 01/09/2019 06:00 AM    GFR est AA 31 (L) 01/08/2019 06:00 AM    GFR est AA 30 (L) 01/07/2019 03:45 PM    GFR est non-AA 22 (L) 09/01/2019 09:30 PM    GFR est non-AA 24 (L) 01/10/2019 05:45 AM    GFR est non-AA 27 (L) 01/09/2019 06:00 AM    GFR est non-AA 26 (L) 01/08/2019 06:00 AM    GFR est non-AA 25 (L) 01/07/2019 03:45 PM    Calcium 9.5 09/01/2019 09:30 PM    Calcium 8.4 (L) 01/10/2019 05:45 AM    Calcium 8.8 01/09/2019 06:00 AM    Calcium 8.4 (L) 01/08/2019 06:00 AM    Calcium 8.6 01/07/2019 03:45 PM    Bilirubin, total 0.6 09/01/2019 09:30 PM    Bilirubin, total 0.5 01/07/2019 03:45 PM    Bilirubin, total 0.8 05/10/2018 12:10 PM    Bilirubin, total 0.5 04/05/2018 03:32 PM    Bilirubin, total 0.5 11/29/2017 03:30 PM    AST (SGOT) 20 09/01/2019 09:30 PM    AST (SGOT) 17 01/07/2019 03:45 PM    AST (SGOT) 18 05/10/2018 12:10 PM    AST (SGOT) 22 04/05/2018 03:32 PM    AST (SGOT) 19 11/29/2017 03:30 PM    Alk. phosphatase 92 09/01/2019 09:30 PM    Alk. phosphatase 98 01/07/2019 03:45 PM    Alk. phosphatase 87 05/10/2018 12:10 PM    Alk. phosphatase 79 04/05/2018 03:32 PM    Alk.  phosphatase 72 11/29/2017 03:30 PM    Protein, total 7.6 09/01/2019 09:30 PM    Protein, total 6.6 01/07/2019 03:45 PM    Protein, total 7.3 05/10/2018 12:10 PM    Protein, total 7.1 04/05/2018 03:32 PM    Protein, total 7.2 11/29/2017 03:30 PM    Albumin 3.3 (L) 09/01/2019 09:30 PM    Albumin 2.7 (L) 01/07/2019 03:45 PM    Albumin 3.0 (L) 05/10/2018 12:10 PM    Albumin 3.9 04/05/2018 03:32 PM    Albumin 4.1 11/29/2017 03:30 PM    Globulin 4.3 (H) 09/01/2019 09:30 PM    Globulin 3.9 01/07/2019 03:45 PM    Globulin 4.3 (H) 05/10/2018 12:10 PM    A-G Ratio 0.8 (L) 09/01/2019 09:30 PM    A-G Ratio 0.7 (L) 01/07/2019 03:45 PM    A-G Ratio 0.7 (L) 05/10/2018 12:10 PM    A-G Ratio 1.2 04/05/2018 03:32 PM    A-G Ratio 1.3 11/29/2017 03:30 PM    ALT (SGPT) 12 09/01/2019 09:30 PM    ALT (SGPT) 12 01/07/2019 03:45 PM    ALT (SGPT) 14 05/10/2018 12:10 PM    ALT (SGPT) 9 04/05/2018 03:32 PM    ALT (SGPT) 11 11/29/2017 03:30 PM     Lab Results   Component Value Date/Time    CK 25 (L) 05/10/2018 12:10 PM     Lab Results   Component Value Date/Time    Cholesterol, total 187 03/16/2016 03:57 PM    HDL Cholesterol 54 03/16/2016 03:57 PM    LDL, calculated 123 (H) 03/16/2016 03:57 PM    Triglyceride 51 03/16/2016 03:57 PM     No results found for this or any previous visit. 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 Lipids and labs followed by PCP. Continue current care and f/u in 6 months.     Diana Sanders MD